# Patient Record
Sex: FEMALE | Race: WHITE | NOT HISPANIC OR LATINO | Employment: UNEMPLOYED | URBAN - NONMETROPOLITAN AREA
[De-identification: names, ages, dates, MRNs, and addresses within clinical notes are randomized per-mention and may not be internally consistent; named-entity substitution may affect disease eponyms.]

---

## 2018-02-06 ENCOUNTER — APPOINTMENT (OUTPATIENT)
Dept: GENERAL RADIOLOGY | Facility: HOSPITAL | Age: 28
End: 2018-02-06

## 2018-02-06 ENCOUNTER — HOSPITAL ENCOUNTER (INPATIENT)
Facility: HOSPITAL | Age: 28
LOS: 3 days | Discharge: HOME | End: 2018-02-09
Attending: EMERGENCY MEDICINE | Admitting: INTERNAL MEDICINE

## 2018-02-06 DIAGNOSIS — J11.1 INFLUENZA: Primary | ICD-10-CM

## 2018-02-06 DIAGNOSIS — N39.0 COMPLICATED UTI (URINARY TRACT INFECTION): ICD-10-CM

## 2018-02-06 PROBLEM — A41.9 SEPSIS (HCC): Status: ACTIVE | Noted: 2018-02-06

## 2018-02-06 LAB
A-A DO2: 35.5 MMHG (ref 0–300)
ALBUMIN SERPL-MCNC: 2.8 G/DL (ref 3.5–5)
ALBUMIN/GLOB SERPL: 0.8 G/DL (ref 1.5–2.5)
ALP SERPL-CCNC: 57 U/L (ref 35–104)
ALT SERPL W P-5'-P-CCNC: 8 U/L (ref 10–36)
ANION GAP SERPL CALCULATED.3IONS-SCNC: 6.8 MMOL/L (ref 3.6–11.2)
ARTERIAL PATENCY WRIST A: POSITIVE
AST SERPL-CCNC: 10 U/L (ref 10–30)
ATMOSPHERIC PRESS: 731 MMHG
B-HCG UR QL: NEGATIVE
BACTERIA UR QL AUTO: ABNORMAL /HPF
BASE EXCESS BLDA CALC-SCNC: -5.2 MMOL/L
BASOPHILS # BLD AUTO: 0.01 10*3/MM3 (ref 0–0.3)
BASOPHILS NFR BLD AUTO: 0.2 % (ref 0–2)
BDY SITE: ABNORMAL
BILIRUB SERPL-MCNC: 0.3 MG/DL (ref 0.2–1.8)
BILIRUB UR QL STRIP: NEGATIVE
BODY TEMPERATURE: 98.6 C
BUN BLD-MCNC: 17 MG/DL (ref 7–21)
BUN/CREAT SERPL: 11.4 (ref 7–25)
CALCIUM SPEC-SCNC: 7.3 MG/DL (ref 7.7–10)
CHLORIDE SERPL-SCNC: 115 MMOL/L (ref 99–112)
CLARITY UR: ABNORMAL
CO2 SERPL-SCNC: 18.2 MMOL/L (ref 24.3–31.9)
COHGB MFR BLD: 1.3 % (ref 0–5)
COLOR UR: YELLOW
CREAT BLD-MCNC: 1.49 MG/DL (ref 0.43–1.29)
CRP SERPL-MCNC: 9.47 MG/DL (ref 0–0.99)
D-LACTATE SERPL-SCNC: 0.8 MMOL/L (ref 0.5–2)
DEPRECATED RDW RBC AUTO: 54.2 FL (ref 37–54)
DEVELOPER EXPIRATION DATE: NORMAL
DEVELOPER LOT NUMBER: NORMAL
EOSINOPHIL # BLD AUTO: 0.02 10*3/MM3 (ref 0–0.7)
EOSINOPHIL NFR BLD AUTO: 0.4 % (ref 0–5)
ERYTHROCYTE [DISTWIDTH] IN BLOOD BY AUTOMATED COUNT: 16.1 % (ref 11.5–14.5)
EXPIRATION DATE: NORMAL
FECAL OCCULT BLOOD SCREEN, POC: NEGATIVE
FLUAV AG NPH QL: POSITIVE
FLUBV AG NPH QL IA: NEGATIVE
GFR SERPL CREATININE-BSD FRML MDRD: 42 ML/MIN/1.73
GLOBULIN UR ELPH-MCNC: 3.6 GM/DL
GLUCOSE BLD-MCNC: 109 MG/DL (ref 70–110)
GLUCOSE UR STRIP-MCNC: NEGATIVE MG/DL
HCO3 BLDA-SCNC: 19.2 MMOL/L (ref 22–26)
HCT VFR BLD AUTO: 27 % (ref 37–47)
HCT VFR BLD CALC: 25 % (ref 37–47)
HGB BLD-MCNC: 8 G/DL (ref 12–16)
HGB BLDA-MCNC: 8.4 G/DL (ref 12–16)
HGB UR QL STRIP.AUTO: ABNORMAL
HOROWITZ INDEX BLD+IHG-RTO: 21 %
HYALINE CASTS UR QL AUTO: ABNORMAL /LPF
IMM GRANULOCYTES # BLD: 0.01 10*3/MM3 (ref 0–0.03)
IMM GRANULOCYTES NFR BLD: 0.2 % (ref 0–0.5)
KETONES UR QL STRIP: NEGATIVE
LEUKOCYTE ESTERASE UR QL STRIP.AUTO: ABNORMAL
LIPASE SERPL-CCNC: 24 U/L (ref 13–60)
LYMPHOCYTES # BLD AUTO: 0.93 10*3/MM3 (ref 1–3)
LYMPHOCYTES NFR BLD AUTO: 19.5 % (ref 21–51)
Lab: NORMAL
MAGNESIUM SERPL-MCNC: 1.5 MG/DL (ref 1.7–2.6)
MCH RBC QN AUTO: 29 PG (ref 27–33)
MCHC RBC AUTO-ENTMCNC: 29.6 G/DL (ref 33–37)
MCV RBC AUTO: 97.8 FL (ref 80–94)
METHGB BLD QL: 0 % (ref 0–3)
MODALITY: ABNORMAL
MONOCYTES # BLD AUTO: 0.57 10*3/MM3 (ref 0.1–0.9)
MONOCYTES NFR BLD AUTO: 11.9 % (ref 0–10)
NEGATIVE CONTROL: NEGATIVE
NEUTROPHILS # BLD AUTO: 3.23 10*3/MM3 (ref 1.4–6.5)
NEUTROPHILS NFR BLD AUTO: 67.8 % (ref 30–70)
NITRITE UR QL STRIP: NEGATIVE
OSMOLALITY SERPL CALC.SUM OF ELEC: 281.5 MOSM/KG (ref 273–305)
OXYHGB MFR BLDV: 88.4 % (ref 85–100)
PCO2 BLDA: 32.7 MM HG (ref 35–45)
PH BLDA: 7.39 PH UNITS (ref 7.35–7.45)
PH UR STRIP.AUTO: 7.5 [PH] (ref 5–8)
PLATELET # BLD AUTO: 250 10*3/MM3 (ref 130–400)
PMV BLD AUTO: 8.7 FL (ref 6–10)
PO2 BLDA: 69 MM HG (ref 80–100)
POSITIVE CONTROL: POSITIVE
POTASSIUM BLD-SCNC: 3.3 MMOL/L (ref 3.5–5.3)
PROT SERPL-MCNC: 6.4 G/DL (ref 6–8)
PROT UR QL STRIP: ABNORMAL
RBC # BLD AUTO: 2.76 10*6/MM3 (ref 4.2–5.4)
RBC # UR: ABNORMAL /HPF
REF LAB TEST METHOD: ABNORMAL
SAO2 % BLDCOA: 89.6 % (ref 90–100)
SODIUM BLD-SCNC: 140 MMOL/L (ref 135–153)
SP GR UR STRIP: 1.01 (ref 1–1.03)
SQUAMOUS #/AREA URNS HPF: ABNORMAL /HPF
UROBILINOGEN UR QL STRIP: ABNORMAL
WBC NRBC COR # BLD: 4.77 10*3/MM3 (ref 4.5–12.5)
WBC UR QL AUTO: ABNORMAL /HPF

## 2018-02-06 PROCEDURE — 82805 BLOOD GASES W/O2 SATURATION: CPT | Performed by: EMERGENCY MEDICINE

## 2018-02-06 PROCEDURE — 94799 UNLISTED PULMONARY SVC/PX: CPT

## 2018-02-06 PROCEDURE — 83690 ASSAY OF LIPASE: CPT | Performed by: EMERGENCY MEDICINE

## 2018-02-06 PROCEDURE — 86140 C-REACTIVE PROTEIN: CPT | Performed by: PHYSICIAN ASSISTANT

## 2018-02-06 PROCEDURE — 25010000002 PIPERACILLIN-TAZOBACTAM: Performed by: EMERGENCY MEDICINE

## 2018-02-06 PROCEDURE — 94640 AIRWAY INHALATION TREATMENT: CPT

## 2018-02-06 PROCEDURE — 87086 URINE CULTURE/COLONY COUNT: CPT | Performed by: EMERGENCY MEDICINE

## 2018-02-06 PROCEDURE — 36600 WITHDRAWAL OF ARTERIAL BLOOD: CPT | Performed by: EMERGENCY MEDICINE

## 2018-02-06 PROCEDURE — 87077 CULTURE AEROBIC IDENTIFY: CPT | Performed by: EMERGENCY MEDICINE

## 2018-02-06 PROCEDURE — 81025 URINE PREGNANCY TEST: CPT | Performed by: EMERGENCY MEDICINE

## 2018-02-06 PROCEDURE — 25010000002 HEPARIN (PORCINE) PER 1000 UNITS: Performed by: PHYSICIAN ASSISTANT

## 2018-02-06 PROCEDURE — 82375 ASSAY CARBOXYHB QUANT: CPT | Performed by: EMERGENCY MEDICINE

## 2018-02-06 PROCEDURE — 83050 HGB METHEMOGLOBIN QUAN: CPT | Performed by: EMERGENCY MEDICINE

## 2018-02-06 PROCEDURE — 71045 X-RAY EXAM CHEST 1 VIEW: CPT | Performed by: RADIOLOGY

## 2018-02-06 PROCEDURE — 87040 BLOOD CULTURE FOR BACTERIA: CPT | Performed by: PHYSICIAN ASSISTANT

## 2018-02-06 PROCEDURE — 71045 X-RAY EXAM CHEST 1 VIEW: CPT

## 2018-02-06 PROCEDURE — 83735 ASSAY OF MAGNESIUM: CPT | Performed by: HOSPITALIST

## 2018-02-06 PROCEDURE — 81001 URINALYSIS AUTO W/SCOPE: CPT | Performed by: EMERGENCY MEDICINE

## 2018-02-06 PROCEDURE — 87804 INFLUENZA ASSAY W/OPTIC: CPT | Performed by: EMERGENCY MEDICINE

## 2018-02-06 PROCEDURE — 83605 ASSAY OF LACTIC ACID: CPT | Performed by: EMERGENCY MEDICINE

## 2018-02-06 PROCEDURE — 99223 1ST HOSP IP/OBS HIGH 75: CPT | Performed by: HOSPITALIST

## 2018-02-06 PROCEDURE — 93010 ELECTROCARDIOGRAM REPORT: CPT | Performed by: INTERNAL MEDICINE

## 2018-02-06 PROCEDURE — 93005 ELECTROCARDIOGRAM TRACING: CPT | Performed by: EMERGENCY MEDICINE

## 2018-02-06 PROCEDURE — 87186 SC STD MICRODIL/AGAR DIL: CPT | Performed by: EMERGENCY MEDICINE

## 2018-02-06 PROCEDURE — 85025 COMPLETE CBC W/AUTO DIFF WBC: CPT | Performed by: EMERGENCY MEDICINE

## 2018-02-06 PROCEDURE — 25010000002 PROMETHAZINE PER 50 MG: Performed by: HOSPITALIST

## 2018-02-06 PROCEDURE — 82270 OCCULT BLOOD FECES: CPT | Performed by: EMERGENCY MEDICINE

## 2018-02-06 PROCEDURE — 80053 COMPREHEN METABOLIC PANEL: CPT | Performed by: EMERGENCY MEDICINE

## 2018-02-06 PROCEDURE — 87040 BLOOD CULTURE FOR BACTERIA: CPT | Performed by: EMERGENCY MEDICINE

## 2018-02-06 PROCEDURE — 99285 EMERGENCY DEPT VISIT HI MDM: CPT

## 2018-02-06 RX ORDER — OSELTAMIVIR PHOSPHATE 6 MG/ML
30 FOR SUSPENSION ORAL EVERY 12 HOURS SCHEDULED
Status: DISCONTINUED | OUTPATIENT
Start: 2018-02-07 | End: 2018-02-09 | Stop reason: HOSPADM

## 2018-02-06 RX ORDER — POTASSIUM CHLORIDE 20 MEQ/1
40 TABLET, EXTENDED RELEASE ORAL AS NEEDED
Status: DISCONTINUED | OUTPATIENT
Start: 2018-02-06 | End: 2018-02-09 | Stop reason: HOSPADM

## 2018-02-06 RX ORDER — SODIUM CHLORIDE 9 MG/ML
100 INJECTION, SOLUTION INTRAVENOUS CONTINUOUS
Status: DISCONTINUED | OUTPATIENT
Start: 2018-02-06 | End: 2018-02-06

## 2018-02-06 RX ORDER — NITROGLYCERIN 0.4 MG/1
0.4 TABLET SUBLINGUAL
Status: DISCONTINUED | OUTPATIENT
Start: 2018-02-06 | End: 2018-02-09 | Stop reason: HOSPADM

## 2018-02-06 RX ORDER — IPRATROPIUM BROMIDE AND ALBUTEROL SULFATE 2.5; .5 MG/3ML; MG/3ML
3 SOLUTION RESPIRATORY (INHALATION)
Status: COMPLETED | OUTPATIENT
Start: 2018-02-06 | End: 2018-02-06

## 2018-02-06 RX ORDER — SODIUM CHLORIDE 9 MG/ML
INJECTION, SOLUTION INTRAVENOUS
Status: COMPLETED
Start: 2018-02-06 | End: 2018-02-06

## 2018-02-06 RX ORDER — SODIUM CHLORIDE 9 MG/ML
100 INJECTION, SOLUTION INTRAVENOUS CONTINUOUS
Status: DISCONTINUED | OUTPATIENT
Start: 2018-02-06 | End: 2018-02-08

## 2018-02-06 RX ORDER — SODIUM CHLORIDE 0.9 % (FLUSH) 0.9 %
1-10 SYRINGE (ML) INJECTION AS NEEDED
Status: DISCONTINUED | OUTPATIENT
Start: 2018-02-06 | End: 2018-02-09 | Stop reason: HOSPADM

## 2018-02-06 RX ORDER — POTASSIUM CHLORIDE 1.5 G/1.77G
40 POWDER, FOR SOLUTION ORAL AS NEEDED
Status: DISCONTINUED | OUTPATIENT
Start: 2018-02-06 | End: 2018-02-09 | Stop reason: HOSPADM

## 2018-02-06 RX ORDER — MAGNESIUM SULFATE HEPTAHYDRATE 40 MG/ML
2 INJECTION, SOLUTION INTRAVENOUS AS NEEDED
Status: DISCONTINUED | OUTPATIENT
Start: 2018-02-06 | End: 2018-02-09 | Stop reason: HOSPADM

## 2018-02-06 RX ORDER — MAGNESIUM SULFATE HEPTAHYDRATE 40 MG/ML
2 INJECTION, SOLUTION INTRAVENOUS AS NEEDED
Status: DISCONTINUED | OUTPATIENT
Start: 2018-02-06 | End: 2018-02-06

## 2018-02-06 RX ORDER — POTASSIUM CHLORIDE 20 MEQ/1
40 TABLET, EXTENDED RELEASE ORAL AS NEEDED
Status: DISCONTINUED | OUTPATIENT
Start: 2018-02-06 | End: 2018-02-06

## 2018-02-06 RX ORDER — POTASSIUM CHLORIDE 7.45 MG/ML
10 INJECTION INTRAVENOUS
Status: DISCONTINUED | OUTPATIENT
Start: 2018-02-06 | End: 2018-02-09 | Stop reason: HOSPADM

## 2018-02-06 RX ORDER — POTASSIUM CHLORIDE 7.45 MG/ML
10 INJECTION INTRAVENOUS
Status: DISCONTINUED | OUTPATIENT
Start: 2018-02-06 | End: 2018-02-06

## 2018-02-06 RX ORDER — MAGNESIUM SULFATE HEPTAHYDRATE 40 MG/ML
4 INJECTION, SOLUTION INTRAVENOUS AS NEEDED
Status: DISCONTINUED | OUTPATIENT
Start: 2018-02-06 | End: 2018-02-06

## 2018-02-06 RX ORDER — MAGNESIUM SULFATE 1 G/100ML
1 INJECTION INTRAVENOUS AS NEEDED
Status: DISCONTINUED | OUTPATIENT
Start: 2018-02-06 | End: 2018-02-09 | Stop reason: HOSPADM

## 2018-02-06 RX ORDER — HEPARIN SODIUM 5000 [USP'U]/ML
5000 INJECTION, SOLUTION INTRAVENOUS; SUBCUTANEOUS EVERY 12 HOURS SCHEDULED
Status: DISCONTINUED | OUTPATIENT
Start: 2018-02-06 | End: 2018-02-09 | Stop reason: HOSPADM

## 2018-02-06 RX ORDER — OSELTAMIVIR PHOSPHATE 75 MG/1
75 CAPSULE ORAL ONCE
Status: COMPLETED | OUTPATIENT
Start: 2018-02-06 | End: 2018-02-06

## 2018-02-06 RX ORDER — MAGNESIUM SULFATE HEPTAHYDRATE 40 MG/ML
4 INJECTION, SOLUTION INTRAVENOUS AS NEEDED
Status: DISCONTINUED | OUTPATIENT
Start: 2018-02-06 | End: 2018-02-09 | Stop reason: HOSPADM

## 2018-02-06 RX ORDER — POTASSIUM CHLORIDE 1.5 G/1.77G
40 POWDER, FOR SOLUTION ORAL AS NEEDED
Status: DISCONTINUED | OUTPATIENT
Start: 2018-02-06 | End: 2018-02-06

## 2018-02-06 RX ADMIN — IPRATROPIUM BROMIDE AND ALBUTEROL SULFATE 3 ML: .5; 3 SOLUTION RESPIRATORY (INHALATION) at 13:54

## 2018-02-06 RX ADMIN — HEPARIN SODIUM 5000 UNITS: 5000 INJECTION, SOLUTION INTRAVENOUS; SUBCUTANEOUS at 19:53

## 2018-02-06 RX ADMIN — PROMETHAZINE HYDROCHLORIDE 6.25 MG: 25 INJECTION INTRAMUSCULAR; INTRAVENOUS at 22:09

## 2018-02-06 RX ADMIN — PIPERACILLIN SODIUM,TAZOBACTAM SODIUM 3.38 G: 3; .375 INJECTION, POWDER, FOR SOLUTION INTRAVENOUS at 16:22

## 2018-02-06 RX ADMIN — IPRATROPIUM BROMIDE AND ALBUTEROL SULFATE 3 ML: .5; 3 SOLUTION RESPIRATORY (INHALATION) at 12:54

## 2018-02-06 RX ADMIN — SODIUM CHLORIDE 150 ML/HR: 9 INJECTION, SOLUTION INTRAVENOUS at 14:20

## 2018-02-06 RX ADMIN — PIPERACILLIN SODIUM,TAZOBACTAM SODIUM 3.38 G: 3; .375 INJECTION, POWDER, FOR SOLUTION INTRAVENOUS at 23:59

## 2018-02-06 RX ADMIN — OSELTAMIVIR PHOSPHATE 75 MG: 75 CAPSULE ORAL at 14:24

## 2018-02-06 RX ADMIN — SODIUM CHLORIDE 1000 ML: 9 INJECTION, SOLUTION INTRAVENOUS at 14:47

## 2018-02-06 RX ADMIN — SODIUM CHLORIDE 1443 ML: 9 INJECTION, SOLUTION INTRAVENOUS at 12:52

## 2018-02-06 RX ADMIN — SODIUM CHLORIDE 100 ML/HR: 9 INJECTION, SOLUTION INTRAVENOUS at 18:46

## 2018-02-06 RX ADMIN — SODIUM CHLORIDE 125 ML/HR: 9 INJECTION, SOLUTION INTRAVENOUS at 23:14

## 2018-02-06 RX ADMIN — IPRATROPIUM BROMIDE AND ALBUTEROL SULFATE 3 ML: .5; 3 SOLUTION RESPIRATORY (INHALATION) at 13:28

## 2018-02-06 RX ADMIN — SODIUM CHLORIDE 150 ML/HR: 9 INJECTION, SOLUTION INTRAVENOUS at 12:48

## 2018-02-06 NOTE — ED NOTES
Attempted to straight cath patient, no urine obtainable at this time. Patient did have wet brief. Changed patient's brief, informed her I would return to attempt to obtain urine again later.     Brittani Holliday, RN  02/06/18 [id number]
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
## 2018-02-06 NOTE — PLAN OF CARE
Problem: Sepsis (Adult)  Goal: Signs and Symptoms of Listed Potential Problems Will be Absent or Manageable (Sepsis)  Outcome: Ongoing (interventions implemented as appropriate)
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
## 2018-02-07 LAB
ANION GAP SERPL CALCULATED.3IONS-SCNC: 7.6 MMOL/L (ref 3.6–11.2)
BASOPHILS # BLD AUTO: 0.01 10*3/MM3 (ref 0–0.3)
BASOPHILS NFR BLD AUTO: 0.3 % (ref 0–2)
BUN BLD-MCNC: 15 MG/DL (ref 7–21)
BUN/CREAT SERPL: 10.6 (ref 7–25)
CALCIUM SPEC-SCNC: 7.1 MG/DL (ref 7.7–10)
CHLORIDE SERPL-SCNC: 116 MMOL/L (ref 99–112)
CO2 SERPL-SCNC: 16.4 MMOL/L (ref 24.3–31.9)
CREAT BLD-MCNC: 1.41 MG/DL (ref 0.43–1.29)
CREAT UR-MCNC: 17.9 MG/DL
CRP SERPL-MCNC: 10.77 MG/DL (ref 0–0.99)
DEPRECATED RDW RBC AUTO: 55.7 FL (ref 37–54)
EOSINOPHIL # BLD AUTO: 0.01 10*3/MM3 (ref 0–0.7)
EOSINOPHIL NFR BLD AUTO: 0.3 % (ref 0–5)
ERYTHROCYTE [DISTWIDTH] IN BLOOD BY AUTOMATED COUNT: 16.1 % (ref 11.5–14.5)
FOLATE SERPL-MCNC: 4.37 NG/ML (ref 5.4–20)
GFR SERPL CREATININE-BSD FRML MDRD: 45 ML/MIN/1.73
GLUCOSE BLD-MCNC: 87 MG/DL (ref 70–110)
HCT VFR BLD AUTO: 26.5 % (ref 37–47)
HGB BLD-MCNC: 7.8 G/DL (ref 12–16)
IMM GRANULOCYTES # BLD: 0.01 10*3/MM3 (ref 0–0.03)
IMM GRANULOCYTES NFR BLD: 0.3 % (ref 0–0.5)
IRON 24H UR-MRATE: 5 MCG/DL (ref 49–151)
IRON SATN MFR SERPL: 2 % (ref 15–50)
LYMPHOCYTES # BLD AUTO: 0.71 10*3/MM3 (ref 1–3)
LYMPHOCYTES NFR BLD AUTO: 18.4 % (ref 21–51)
MAGNESIUM SERPL-MCNC: 1.6 MG/DL (ref 1.7–2.6)
MCH RBC QN AUTO: 29.1 PG (ref 27–33)
MCHC RBC AUTO-ENTMCNC: 29.4 G/DL (ref 33–37)
MCV RBC AUTO: 98.9 FL (ref 80–94)
MONOCYTES # BLD AUTO: 0.3 10*3/MM3 (ref 0.1–0.9)
MONOCYTES NFR BLD AUTO: 7.8 % (ref 0–10)
NEUTROPHILS # BLD AUTO: 2.81 10*3/MM3 (ref 1.4–6.5)
NEUTROPHILS NFR BLD AUTO: 72.9 % (ref 30–70)
OSMOLALITY SERPL CALC.SUM OF ELEC: 279.6 MOSM/KG (ref 273–305)
PLATELET # BLD AUTO: 266 10*3/MM3 (ref 130–400)
PMV BLD AUTO: 9.2 FL (ref 6–10)
POTASSIUM BLD-SCNC: 4 MMOL/L (ref 3.5–5.3)
PROT UR-MCNC: 88.1 MG/DL
PROT/CREAT UR: 4921.8 MG/G CREA (ref 0–200)
RBC # BLD AUTO: 2.68 10*6/MM3 (ref 4.2–5.4)
SODIUM BLD-SCNC: 140 MMOL/L (ref 135–153)
TIBC SERPL-MCNC: 206 MCG/DL (ref 241–421)
VIT B12 BLD-MCNC: 265 PG/ML (ref 211–911)
WBC NRBC COR # BLD: 3.85 10*3/MM3 (ref 4.5–12.5)

## 2018-02-07 PROCEDURE — 85025 COMPLETE CBC W/AUTO DIFF WBC: CPT | Performed by: PHYSICIAN ASSISTANT

## 2018-02-07 PROCEDURE — 94799 UNLISTED PULMONARY SVC/PX: CPT

## 2018-02-07 PROCEDURE — 25010000002 HEPARIN (PORCINE) PER 1000 UNITS: Performed by: PHYSICIAN ASSISTANT

## 2018-02-07 PROCEDURE — 25010000002 CEFEPIME PER 500 MG: Performed by: INTERNAL MEDICINE

## 2018-02-07 PROCEDURE — 25010000002 MAGNESIUM SULFATE 2 GM/50ML SOLUTION: Performed by: HOSPITALIST

## 2018-02-07 PROCEDURE — 25010000002 PIPERACILLIN-TAZOBACTAM: Performed by: PHYSICIAN ASSISTANT

## 2018-02-07 PROCEDURE — 82607 VITAMIN B-12: CPT | Performed by: PHYSICIAN ASSISTANT

## 2018-02-07 PROCEDURE — 83540 ASSAY OF IRON: CPT | Performed by: PHYSICIAN ASSISTANT

## 2018-02-07 PROCEDURE — 99233 SBSQ HOSP IP/OBS HIGH 50: CPT | Performed by: INTERNAL MEDICINE

## 2018-02-07 PROCEDURE — 83550 IRON BINDING TEST: CPT | Performed by: PHYSICIAN ASSISTANT

## 2018-02-07 PROCEDURE — 86140 C-REACTIVE PROTEIN: CPT | Performed by: PHYSICIAN ASSISTANT

## 2018-02-07 PROCEDURE — 82570 ASSAY OF URINE CREATININE: CPT | Performed by: INTERNAL MEDICINE

## 2018-02-07 PROCEDURE — 84156 ASSAY OF PROTEIN URINE: CPT | Performed by: INTERNAL MEDICINE

## 2018-02-07 PROCEDURE — 80048 BASIC METABOLIC PNL TOTAL CA: CPT | Performed by: PHYSICIAN ASSISTANT

## 2018-02-07 PROCEDURE — 83735 ASSAY OF MAGNESIUM: CPT | Performed by: HOSPITALIST

## 2018-02-07 PROCEDURE — 82746 ASSAY OF FOLIC ACID SERUM: CPT | Performed by: PHYSICIAN ASSISTANT

## 2018-02-07 RX ORDER — ACETAMINOPHEN 325 MG/1
650 TABLET ORAL EVERY 6 HOURS PRN
Status: DISCONTINUED | OUTPATIENT
Start: 2018-02-07 | End: 2018-02-09 | Stop reason: HOSPADM

## 2018-02-07 RX ORDER — SODIUM HYPOCHLORITE 1.25 MG/ML
SOLUTION TOPICAL 2 TIMES DAILY
Status: DISCONTINUED | OUTPATIENT
Start: 2018-02-07 | End: 2018-02-09 | Stop reason: HOSPADM

## 2018-02-07 RX ORDER — FOLIC ACID 1 MG/1
1 TABLET ORAL DAILY
Status: DISCONTINUED | OUTPATIENT
Start: 2018-02-07 | End: 2018-02-09 | Stop reason: HOSPADM

## 2018-02-07 RX ORDER — MAGNESIUM SULFATE HEPTAHYDRATE 40 MG/ML
2 INJECTION, SOLUTION INTRAVENOUS ONCE
Status: COMPLETED | OUTPATIENT
Start: 2018-02-07 | End: 2018-02-07

## 2018-02-07 RX ORDER — LANOLIN ALCOHOL/MO/W.PET/CERES
1000 CREAM (GRAM) TOPICAL DAILY
Status: DISCONTINUED | OUTPATIENT
Start: 2018-02-07 | End: 2018-02-09 | Stop reason: HOSPADM

## 2018-02-07 RX ORDER — FERROUS SULFATE 325(65) MG
325 TABLET ORAL 2 TIMES DAILY WITH MEALS
Status: DISCONTINUED | OUTPATIENT
Start: 2018-02-07 | End: 2018-02-09 | Stop reason: HOSPADM

## 2018-02-07 RX ADMIN — HEPARIN SODIUM 5000 UNITS: 5000 INJECTION, SOLUTION INTRAVENOUS; SUBCUTANEOUS at 20:55

## 2018-02-07 RX ADMIN — ACETAMINOPHEN 650 MG: 325 TABLET, FILM COATED ORAL at 23:31

## 2018-02-07 RX ADMIN — ACETAMINOPHEN 650 MG: 325 TABLET, FILM COATED ORAL at 08:25

## 2018-02-07 RX ADMIN — CEFEPIME: 1 INJECTION, POWDER, FOR SOLUTION INTRAVENOUS at 16:47

## 2018-02-07 RX ADMIN — SODIUM CHLORIDE 125 ML/HR: 9 INJECTION, SOLUTION INTRAVENOUS at 12:21

## 2018-02-07 RX ADMIN — SODIUM HYPOCHLORITE: 1.25 SOLUTION TOPICAL at 11:41

## 2018-02-07 RX ADMIN — CEFEPIME: 1 INJECTION, POWDER, FOR SOLUTION INTRAVENOUS at 18:00

## 2018-02-07 RX ADMIN — FERROUS SULFATE TAB 325 MG (65 MG ELEMENTAL FE) 325 MG: 325 (65 FE) TAB at 17:25

## 2018-02-07 RX ADMIN — OSELTAMIVIR PHOSPHATE 30 MG: 6 POWDER, FOR SUSPENSION ORAL at 08:25

## 2018-02-07 RX ADMIN — PIPERACILLIN SODIUM,TAZOBACTAM SODIUM 3.38 G: 3; .375 INJECTION, POWDER, FOR SOLUTION INTRAVENOUS at 08:25

## 2018-02-07 RX ADMIN — SODIUM HYPOCHLORITE: 1.25 SOLUTION TOPICAL at 20:54

## 2018-02-07 RX ADMIN — CEFEPIME: 1 INJECTION, POWDER, FOR SOLUTION INTRAVENOUS at 20:58

## 2018-02-07 RX ADMIN — CEFEPIME: 1 INJECTION, POWDER, FOR SOLUTION INTRAVENOUS at 17:50

## 2018-02-07 RX ADMIN — Medication 1000 MCG: at 17:25

## 2018-02-07 RX ADMIN — MAGNESIUM SULFATE HEPTAHYDRATE 2 G: 40 INJECTION, SOLUTION INTRAVENOUS at 17:28

## 2018-02-07 RX ADMIN — FOLIC ACID 1 MG: 1 TABLET ORAL at 17:25

## 2018-02-07 RX ADMIN — HEPARIN SODIUM 5000 UNITS: 5000 INJECTION, SOLUTION INTRAVENOUS; SUBCUTANEOUS at 08:25

## 2018-02-07 RX ADMIN — OSELTAMIVIR PHOSPHATE 30 MG: 6 POWDER, FOR SUSPENSION ORAL at 20:55

## 2018-02-07 RX ADMIN — SODIUM CHLORIDE 100 ML/HR: 9 INJECTION, SOLUTION INTRAVENOUS at 23:31
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
## 2018-02-07 NOTE — PLAN OF CARE
Problem: Sepsis (Adult)  Goal: Signs and Symptoms of Listed Potential Problems Will be Absent or Manageable (Sepsis)  Outcome: Ongoing (interventions implemented as appropriate)      Problem: Pressure Ulcer (Adult)  Goal: Signs and Symptoms of Listed Potential Problems Will be Absent or Manageable (Pressure Ulcer)  Outcome: Ongoing (interventions implemented as appropriate)

## 2018-02-07 NOTE — NURSING NOTE
Patient presents with Stage 4 PI's to coccyx and left ischial tuberosity and a Stage 3 to right ischial tuberosity.  See flowsheet documentation.  Foul odor present.  Treatment ordered.

## 2018-02-07 NOTE — PROGRESS NOTES
Malnutrition Severity Assessment    Patient Name:  Awais Dorman  YOB: 1990  MRN: [id number]  Admit Date:  2/6/2018    Patient meets criteria for : Moderate malnutrition    Comments:  MSA initiated due to BMI 16.6.  MD please review, co-sign and document severity of moderate nutrition in the problem list.  Will start supplement drink bid.  Recommend add MVI daily to promote wound healing.     Malnutrition Type: Chronic Illness Malnutrition     Malnutrition Type (last 8 hours)      Malnutrition Severity Assessment       02/07/18 1747    Malnutrition Severity Assessment    Malnutrition Type Chronic Illness Malnutrition      02/07/18 1747    Physical Signs of Malnutrition (Chronic)    Muscle Wasting Mild    Fat Loss Mild    Fluid Accumulation None    Secondary Physical Signs Present (comment)   pressure ulcers      02/07/18 1747    Weight Status (Chronic)    BMI Mod (<17)    %IBW Mod <80%      02/07/18 1747    Criteria Met (Must meet criteria for severity in at least 2 of these categories: M Wasting, Fat Loss, Fluid, Secondary Signs, Wt. Status, Intake)    Patient meets criteria for  Moderate malnutrition          Electronically signed by:  Brittani Ramsay RD  02/07/18 5:50 PM

## 2018-02-07 NOTE — PROGRESS NOTES
Discharge Planning Assessment   Yovany     Patient Name: Awais Dorman  MRN: [id number]  Today's Date: 2/7/2018    Admit Date: 2/6/2018          Discharge Needs Assessment       02/07/18 1345    Living Environment    Lives With spouse    Living Arrangements house    Type of Financial/Environmental Concern none    Transportation Available car    Living Environment    Provides Primary Care For spouse    Primary Care Provided By spouse/significant other    Quality Of Family Relationships supportive    Able to Return to Prior Living Arrangements yes            Discharge Plan       02/07/18 1345    Case Management/Social Work Plan    Plan Pt admitted on 2/6/18.  SS received consult per Case Management for discharge planning.  SS spoke with pt on this date.  Pt lives at home with spouse and plans to return home at discharge.  Pt currently does not utilize home health services.  Pt currently utilizes wheelchair, hospital bed and ebonie lift via unknown provider.  Pt's PCP is Dr. Mcgregor.  Pt currently utilizes [facility].  Pt states no living will or POA.  Pt states no additional education needed. SS will follow and assist with discharge needs.     Patient/Family In Agreement With Plan yes        Discharge Placement     No information found                Demographic Summary       02/07/18 1344    Referral Information    Admission Type inpatient    Arrived From admitted as an inpatient    Referral Source nursing    Reason For Consult discharge planning            Functional Status     None            Psychosocial     None            Abuse/Neglect     None            Legal     None            Substance Abuse     None            Patient Forms     None          Shiela Garcia

## 2018-02-08 LAB
ANION GAP SERPL CALCULATED.3IONS-SCNC: 11.7 MMOL/L (ref 3.6–11.2)
BASOPHILS # BLD AUTO: 0.01 10*3/MM3 (ref 0–0.3)
BASOPHILS NFR BLD AUTO: 0.3 % (ref 0–2)
BUN BLD-MCNC: 11 MG/DL (ref 7–21)
BUN/CREAT SERPL: 7.1 (ref 7–25)
CALCIUM SPEC-SCNC: 7.1 MG/DL (ref 7.7–10)
CHLORIDE SERPL-SCNC: 112 MMOL/L (ref 99–112)
CO2 SERPL-SCNC: 15.3 MMOL/L (ref 24.3–31.9)
CREAT BLD-MCNC: 1.55 MG/DL (ref 0.43–1.29)
CRP SERPL-MCNC: 8.16 MG/DL (ref 0–0.99)
DEPRECATED RDW RBC AUTO: 55.5 FL (ref 37–54)
EOSINOPHIL # BLD AUTO: 0.06 10*3/MM3 (ref 0–0.7)
EOSINOPHIL NFR BLD AUTO: 1.7 % (ref 0–5)
ERYTHROCYTE [DISTWIDTH] IN BLOOD BY AUTOMATED COUNT: 16.2 % (ref 11.5–14.5)
GFR SERPL CREATININE-BSD FRML MDRD: 40 ML/MIN/1.73
GLUCOSE BLD-MCNC: 70 MG/DL (ref 70–110)
GLUCOSE BLDC GLUCOMTR-MCNC: 75 MG/DL (ref 70–130)
HCT VFR BLD AUTO: 26.3 % (ref 37–47)
HGB BLD-MCNC: 7.8 G/DL (ref 12–16)
IMM GRANULOCYTES # BLD: 0.01 10*3/MM3 (ref 0–0.03)
IMM GRANULOCYTES NFR BLD: 0.3 % (ref 0–0.5)
LYMPHOCYTES # BLD AUTO: 1.49 10*3/MM3 (ref 1–3)
LYMPHOCYTES NFR BLD AUTO: 42.8 % (ref 21–51)
MAGNESIUM SERPL-MCNC: 2.5 MG/DL (ref 1.7–2.6)
MCH RBC QN AUTO: 29.1 PG (ref 27–33)
MCHC RBC AUTO-ENTMCNC: 29.7 G/DL (ref 33–37)
MCV RBC AUTO: 98.1 FL (ref 80–94)
MONOCYTES # BLD AUTO: 0.44 10*3/MM3 (ref 0.1–0.9)
MONOCYTES NFR BLD AUTO: 12.6 % (ref 0–10)
NEUTROPHILS # BLD AUTO: 1.47 10*3/MM3 (ref 1.4–6.5)
NEUTROPHILS NFR BLD AUTO: 42.3 % (ref 30–70)
OSMOLALITY SERPL CALC.SUM OF ELEC: 275.4 MOSM/KG (ref 273–305)
PLATELET # BLD AUTO: 257 10*3/MM3 (ref 130–400)
PMV BLD AUTO: 9.4 FL (ref 6–10)
POTASSIUM BLD-SCNC: 4.1 MMOL/L (ref 3.5–5.3)
RBC # BLD AUTO: 2.68 10*6/MM3 (ref 4.2–5.4)
SODIUM BLD-SCNC: 139 MMOL/L (ref 135–153)
WBC NRBC COR # BLD: 3.48 10*3/MM3 (ref 4.5–12.5)

## 2018-02-08 PROCEDURE — 25010000002 CEFEPIME PER 500 MG: Performed by: INTERNAL MEDICINE

## 2018-02-08 PROCEDURE — 82962 GLUCOSE BLOOD TEST: CPT

## 2018-02-08 PROCEDURE — 94799 UNLISTED PULMONARY SVC/PX: CPT

## 2018-02-08 PROCEDURE — 85025 COMPLETE CBC W/AUTO DIFF WBC: CPT | Performed by: INTERNAL MEDICINE

## 2018-02-08 PROCEDURE — 83735 ASSAY OF MAGNESIUM: CPT | Performed by: INTERNAL MEDICINE

## 2018-02-08 PROCEDURE — 80048 BASIC METABOLIC PNL TOTAL CA: CPT | Performed by: INTERNAL MEDICINE

## 2018-02-08 PROCEDURE — 99232 SBSQ HOSP IP/OBS MODERATE 35: CPT | Performed by: INTERNAL MEDICINE

## 2018-02-08 PROCEDURE — 86140 C-REACTIVE PROTEIN: CPT | Performed by: INTERNAL MEDICINE

## 2018-02-08 PROCEDURE — 25010000002 HEPARIN (PORCINE) PER 1000 UNITS: Performed by: PHYSICIAN ASSISTANT

## 2018-02-08 RX ORDER — MULTIVITAMIN
1 TABLET ORAL DAILY
Status: DISCONTINUED | OUTPATIENT
Start: 2018-02-08 | End: 2018-02-09 | Stop reason: HOSPADM

## 2018-02-08 RX ADMIN — HEPARIN SODIUM 5000 UNITS: 5000 INJECTION, SOLUTION INTRAVENOUS; SUBCUTANEOUS at 21:25

## 2018-02-08 RX ADMIN — Medication 1000 MCG: at 08:21

## 2018-02-08 RX ADMIN — HEPARIN SODIUM 5000 UNITS: 5000 INJECTION, SOLUTION INTRAVENOUS; SUBCUTANEOUS at 08:21

## 2018-02-08 RX ADMIN — OSELTAMIVIR PHOSPHATE 30 MG: 6 POWDER, FOR SUSPENSION ORAL at 08:21

## 2018-02-08 RX ADMIN — FERROUS SULFATE TAB 325 MG (65 MG ELEMENTAL FE) 325 MG: 325 (65 FE) TAB at 17:32

## 2018-02-08 RX ADMIN — SODIUM CHLORIDE 100 ML/HR: 9 INJECTION, SOLUTION INTRAVENOUS at 11:57

## 2018-02-08 RX ADMIN — CEFEPIME HYDROCHLORIDE 1 G: 1 INJECTION, SOLUTION INTRAVENOUS at 08:45

## 2018-02-08 RX ADMIN — OSELTAMIVIR PHOSPHATE 30 MG: 6 POWDER, FOR SUSPENSION ORAL at 21:25

## 2018-02-08 RX ADMIN — FOLIC ACID 1 MG: 1 TABLET ORAL at 08:21

## 2018-02-08 RX ADMIN — FERROUS SULFATE TAB 325 MG (65 MG ELEMENTAL FE) 325 MG: 325 (65 FE) TAB at 08:21

## 2018-02-08 RX ADMIN — CEFEPIME HYDROCHLORIDE 1 G: 1 INJECTION, SOLUTION INTRAVENOUS at 21:25

## 2018-02-08 RX ADMIN — SODIUM HYPOCHLORITE: 1.25 SOLUTION TOPICAL at 09:00

## 2018-02-08 RX ADMIN — Medication 1 TABLET: at 17:24

## 2018-02-08 NOTE — PROGRESS NOTES
"  I have personally seen and examined the patient today and discussed overnight interval progress and pertinent issues with nursing staff.    Subjective:    Patient seems to be more comfortable today and states she is feeling better.  CRP showing improvement from 10.77 down to 8.16 with persistent leukopenia.  No fever reported overnight.  Culture still in progress.    History taken from: patient chart RN      Vital Signs    BP 98/66  Pulse 88  Temp 97.4 °F (36.3 °C) (Axillary)   Resp 18  Ht 167.6 cm (66\")  Wt 45.9 kg (101 lb 4.8 oz)  SpO2 97%  BMI 16.35 kg/m2    Temp:  [97.4 °F (36.3 °C)-100.5 °F (38.1 °C)] 97.4 °F (36.3 °C)      Intake/Output Summary (Last 24 hours) at 02/08/18 1115  Last data filed at 02/08/18 0300   Gross per 24 hour   Intake             1680 ml   Output                0 ml   Net             1680 ml     Intake & Output (last 3 days)       02/05 0701 - 02/06 0700 02/06 0701 - 02/07 0700 02/07 0701 - 02/08 0700 02/08 0701 - 02/09 0700    P.O.  120 780     I.V. (mL/kg)  875 (18.7) 900 (19.6)     IV Piggyback  1543      Total Intake(mL/kg)  2538 (54.4) 1680 (36.6)     Net   +2538 +1680              Unmeasured Urine Occurrence  2 x 9 x     Unmeasured Stool Occurrence  1 x 2 x           Physical Exam:       General Appearance:    Alert, cooperative, in no acute distress   Head:    Normocephalic, without obvious abnormality, atraumatic   Eyes:            Lids and lashes normal, conjunctivae and sclerae normal, no   icterus, no pallor, corneas clear, PERRLA   Ears:    Ears appear intact with no abnormalities noted   Throat:   No oral lesions, no thrush, oral mucosa moist   Neck:   No adenopathy, supple, trachea midline, no thyromegaly, no   carotid bruit, no JVD   Back:     No tenderness to percussion or palpation, range of motion   normal   Lungs:     Clear to auscultation,respirations regular, even and unlabored. No wheezing, no ronchi and no crackles.    Heart:    Regular rhythm and normal " rate, normal S1 and S2, no            murmur, no gallop, no rub, no click   Chest Wall:    No abnormalities observed   Abdomen:     Normal bowel sounds, no masses, no organomegaly, soft        non-tender, non-distended, no guarding, no rebound                tenderness   Rectal:     Deferred   Extremities:  Quadriplegia , hands contracted    Pulses:   Pulses palpable and equal bilaterally   Skin:  Chronic ongoing coccyx.  Wound    Lymph nodes:   No palpable adenopathy   Neurologic:   Awake, alert and oriented x 3. Following commands.         Results:      Results from last 7 days  Lab Units 02/08/18  0310 02/07/18  0106 02/06/18  1314   WBC 10*3/mm3 3.48* 3.85* 4.77     Lab Results   Component Value Date    NEUTROABS 1.47 02/08/2018         Results from last 7 days  Lab Units 02/08/18  0310   CREATININE mg/dL 1.55*         Results from last 7 days  Lab Units 02/08/18  0310 02/07/18  0106 02/06/18  1832   CRP mg/dL 8.16* 10.77* 9.47*       Results Review:    I have personally reviewed laboratory data, culture results, radiology studies and antimicrobial therapy.    Hospital Medications (active)       Dose Frequency Start End    acetaminophen (TYLENOL) tablet 650 mg 650 mg Every 6 Hours PRN 2/7/2018     Sig - Route: Take 2 tablets by mouth Every 6 (Six) Hours As Needed for Mild Pain . - Oral    Cosign for Ordering: Required by Apollo Amado MD    cefepime (MAXIPIME) IVPB 1 g/50ml D5W (premix) 1 g Every 12 Hours 2/8/2018 2/15/2018    Sig - Route: Infuse 50 mL into a venous catheter Every 12 (Twelve) Hours. - Intravenous    Notes to Pharmacy: Continued dosing if no reaction from graded challenge. If reaction has occurred please discontinue dosing.    Cefepime HCl (MAXIPIME) 1 mg in sodium chloride 0.9 % 50 mL IVPB  Once 2/7/2018 2/7/2018    Sig - Route: Infuse  into a venous catheter 1 (One) Time. - Intravenous    Cefepime HCl (MAXIPIME) 10 mg in sodium chloride 0.9 % 50 mL IVPB  Once 2/7/2018 2/7/2018    Sig - Route:  "Infuse  into a venous catheter 1 (One) Time. - Intravenous    Cefepime HCl (MAXIPIME) 100 mg in sodium chloride 0.9 % 50 mL IVPB  Once 2/7/2018 2/7/2018    Sig - Route: Infuse  into a venous catheter 1 (One) Time. - Intravenous    Cefepime HCl (MAXIPIME) 889 mg in sodium chloride 0.9 % 100 mL IVPB  Once 2/7/2018 2/7/2018    Sig - Route: Infuse  into a venous catheter 1 (One) Time. - Intravenous    ferrous sulfate tablet 325 mg 325 mg 2 Times Daily With Meals 2/7/2018     Sig - Route: Take 1 tablet by mouth 2 (Two) Times a Day With Meals. - Oral    folic acid (FOLVITE) tablet 1 mg 1 mg Daily 2/7/2018     Sig - Route: Take 1 tablet by mouth Daily. - Oral    heparin (porcine) 5000 UNIT/ML injection 5,000 Units 5,000 Units Every 12 Hours Scheduled 2/6/2018     Sig - Route: Inject 1 mL under the skin Every 12 (Twelve) Hours. - Subcutaneous    Cosign for Ordering: Required by Apollo Amado MD    Magnesium Sulfate 2 gram infusion- Mg 1.6 - 1.9 mg/dL 2 g As Needed 2/6/2018     Sig - Route: Infuse 50 mL into a venous catheter As Needed (Mg 1.6 - 1.9 mg/dL). - Intravenous    Linked Group 1:  \"Or\" Linked Group Details        Magnesium Sulfate 2 gram infusion- Mg 1.6 - 1.9 mg/dL 2 g Once 2/7/2018 2/7/2018    Sig - Route: Infuse 50 mL into a venous catheter 1 (One) Time. - Intravenous    magnesium sulfate 3 gram infusion (1gm x 3) - Mg 1.1 - 1.5 mg/dL 1 g As Needed 2/6/2018     Sig - Route: Infuse 100 mL into a venous catheter As Needed (Mg 1.1 - 1.5 mg/dL). - Intravenous    Linked Group 1:  \"Or\" Linked Group Details        magnesium sulfate 4 gram infusion - Mg less than or equal to 1mg/dL 4 g As Needed 2/6/2018     Sig - Route: Infuse 100 mL into a venous catheter As Needed (Mg less than or equal to 1mg/dL). - Intravenous    Linked Group 1:  \"Or\" Linked Group Details        nitroglycerin (NITROSTAT) SL tablet 0.4 mg 0.4 mg Every 5 Minutes PRN 2/6/2018     Sig - Route: Place 1 tablet under the tongue Every 5 (Five) Minutes As " "Needed for Chest Pain (if systolic BP greater than 100 mm/Hg.). - Sublingual    Cosign for Ordering: Required by Apollo Amado MD    oseltamivir (TAMIFLU) 6 MG/ML suspension 30 mg 30 mg Every 12 Hours Scheduled 2/7/2018 2/12/2018    Sig - Route: Take 5 mL by mouth Every 12 (Twelve) Hours. - Oral    potassium chloride (K-DUR,KLOR-CON) CR tablet 40 mEq 40 mEq As Needed 2/6/2018     Sig - Route: Take 2 tablets by mouth As Needed (potassium replacement.  see admin instructions). - Oral    Linked Group 2:  \"Or\" Linked Group Details        potassium chloride (KLOR-CON) packet 40 mEq 40 mEq As Needed 2/6/2018     Sig - Route: Take 40 mEq by mouth As Needed (potassium replacement, see admin instructions). - Oral    Linked Group 2:  \"Or\" Linked Group Details        potassium chloride 10 mEq in 100 mL IVPB 10 mEq Every 1 Hour PRN 2/6/2018     Sig - Route: Infuse 100 mL into a venous catheter Every 1 (One) Hour As Needed (potassium protocol PERIPHERAL - see admin instructions). - Intravenous    Linked Group 2:  \"Or\" Linked Group Details        promethazine (PHENERGAN) 6.25 mg in sodium chloride 0.9 % 50 mL 6.25 mg Every 6 Hours PRN 2/6/2018     Sig - Route: Infuse 6.25 mg into a venous catheter Every 6 (Six) Hours As Needed for Nausea or Vomiting (hold for oversedation). - Intravenous    sodium chloride 0.9 % flush 1-10 mL 1-10 mL As Needed 2/6/2018     Sig - Route: Infuse 1-10 mL into a venous catheter As Needed for Line Care. - Intravenous    Cosign for Ordering: Required by Apollo Amado MD    sodium chloride 0.9 % infusion 100 mL/hr Continuous 2/6/2018     Sig - Route: Infuse 100 mL/hr into a venous catheter Continuous. - Intravenous    sodium hypochlorite (DAKIN'S 1/4 STRENGTH) 0.125 % topical solution 0.125% solution  2 Times Daily 2/7/2018     Sig - Route: Apply  topically 2 (Two) Times a Day. - Topical    vitamin B-12 (CYANOCOBALAMIN) tablet 1,000 mcg 1,000 mcg Daily 2/7/2018     Sig - Route: Take 1 tablet by mouth " Daily. - Oral    Cefepime HCl (MAXIPIME) 1 g in sodium chloride 0.9 % 100 mL IVPB (Discontinued)  Every 12 Hours 2/8/2018 2/7/2018    Sig - Route: Infuse  into a venous catheter Every 12 (Twelve) Hours. - Intravenous    Reason for Discontinue: Reorder    Pharmacy Consult - Pharmacy to dose (Discontinued)  Continuous PRN 2/6/2018 2/7/2018    Sig - Route: Continuous As Needed for Consult. - Does not apply    Reason for Discontinue: Duplicate order    Cosign for Ordering: Required by Apollo Amado MD    Pharmacy Consult - Pharmacy to dose (Discontinued)  Continuous PRN 2/7/2018 2/7/2018    Sig - Route: Continuous As Needed for Consult. - Does not apply    Reason for Discontinue: Duplicate order    Pharmacy Consult (Discontinued)  Continuous PRN 2/7/2018 2/7/2018    Sig - Route: Continuous As Needed for Consult. - Does not apply    Reason for Discontinue: *Therapy completed    Pharmacy to Dose Zosyn (Discontinued)  Continuous PRN 2/6/2018 2/7/2018    Sig - Route: Continuous As Needed for Consult. - Does not apply    Reason for Discontinue: Duplicate order    Cosign for Ordering: Required by Apollo Amado MD    piperacillin-tazobactam (ZOSYN) 3.375 g/100 mL 0.9% NS IVPB (mbp) (Discontinued) 3.375 g Every 8 Hours 2/7/2018 2/7/2018    Sig - Route: Infuse 100 mL into a venous catheter Every 8 (Eight) Hours. - Intravenous            Cultures:    Blood Culture   Date Value Ref Range Status   02/06/2018 No growth at 24 hours  Preliminary   02/06/2018 No growth at 24 hours  Preliminary   02/06/2018 No growth at 24 hours  Preliminary           Assessment/Plan     ASSESSMENT:     #1 Sepsis  #2 UTI   #3 Influenza     PLAN:    Patient seems to be more comfortable today and states she is feeling better.  CRP showing improvement from 10.77 down to 8.16 with persistent leukopenia.  No fever reported overnight.  Culture still in progress.     Patient presented with  ongoing cough, shortness of breath over 2 days.  He remembers  recently diagnosed with influenza.  Positive urinalysis from 2/6/18 with culture in process.  CRP 10.7 with neutropenia.  Chest x-ray from 2/6/18 unremarkable.  Blood cultures from 2/6/18 show no growth so far.  Influenza 2/6/18 positive.  Normal lactic acid.     Cefepime pharmacy to dose empirically until culture finalization and susceptibilities are available.     Zosyn was discontinued.     Neutropenia could be related to acute influenza.     We'll continue to follow closely and adjust therapy when cultures and activities are available.     CRP in the a.m.       Patient's findings and recommendations were discussed with patient and nursing staff    Code Status: Full Code    Milagro Vasquez PA-C  02/08/18  11:15 AM

## 2018-02-08 NOTE — PROGRESS NOTES
Subjective     History:   Awais Dorman is a 27 y.o. female admitted on 2/6/2018 secondary to Sepsis     Procedures: None    Patient seen and examined with KENYATTA Mccloud. Awake and alert. States she feels slightly improved from yesterday. Reports intermittent dyspnea. Reports dry cough. States she feels slightly swollen from the IVF's. Denies CP. No acute events overnight per RN.     History taken from: patient, chart, and RN.      Objective     Vital Signs  Temp:  [97.4 °F (36.3 °C)-100.5 °F (38.1 °C)] 97.7 °F (36.5 °C)  Heart Rate:  [83-96] 86  Resp:  [18] 18  BP: ()/(66-84) 112/75    Intake/Output Summary (Last 24 hours) at 02/08/18 1712  Last data filed at 02/08/18 0300   Gross per 24 hour   Intake              960 ml   Output                0 ml   Net              960 ml         Physical Exam:  General:    Awake, alert, in no acute distress   Heart:      Normal S1 and S2. Regular rate and rhythm. No significant murmur, rubs or gallops appreciated.   Lungs:     Respirations regular, even and unlabored. Lungs clear to auscultation B/L. No wheezes, rales or rhonchi.   Abdomen:   Soft and nontender. No guarding, rebound tenderness or  organomegaly noted. Bowel sounds present x 4.   Extremities:  B/L UE and LE contractures. Limited movement of UE's. No movement or sensation in B/L LE's.      Results Review:      Results from last 7 days  Lab Units 02/08/18  0310 02/07/18  0106 02/06/18  1314   WBC 10*3/mm3 3.48* 3.85* 4.77   HEMOGLOBIN g/dL 7.8* 7.8* 8.0*   PLATELETS 10*3/mm3 257 266 250       Results from last 7 days  Lab Units 02/08/18  0310 02/07/18  0106 02/06/18  1314   SODIUM mmol/L 139 140 140   POTASSIUM mmol/L 4.1 4.0 3.3*   CHLORIDE mmol/L 112 116* 115*   CO2 mmol/L 15.3* 16.4* 18.2*   BUN mg/dL 11 15 17   CREATININE mg/dL 1.55* 1.41* 1.49*   CALCIUM mg/dL 7.1* 7.1* 7.3*   GLUCOSE mg/dL 70 87 109       Results from last 7 days  Lab Units 02/06/18  1314   BILIRUBIN mg/dL 0.3   ALK PHOS U/L 57   AST  (SGOT) U/L 10   ALT (SGPT) U/L 8*       Results from last 7 days  Lab Units 02/08/18  0310 02/07/18  0106 02/06/18  1832   MAGNESIUM mg/dL 2.5 1.6* 1.5*               Imaging Results (last 24 hours)     ** No results found for the last 24 hours. **            Medications:    cefepime 1 g Intravenous Q12H   ferrous sulfate 325 mg Oral BID With Meals   folic acid 1 mg Oral Daily   heparin (porcine) 5,000 Units Subcutaneous Q12H   multivitamin 1 tablet Oral Daily   oseltamivir 30 mg Oral Q12H   sodium hypochlorite  Topical BID   vitamin B-12 1,000 mcg Oral Daily              Assessment/Plan   Sepsis: Likely 2/2 influenza A and complicated UTI. BP remains borderline low but overall stable. Afebrile today. Remains leukopenic. CRP improved today. Currently on Tamiflu and Cefepime per ID recs. Blood cultures with NGTD. Urine culture in process. Cont to follow cultures and repeat labs in the AM. ID input appreciated.     Influenza A: Cont Tamiflu and isolation.    Complicated UTI: Pt requires straight caths at home. Cont Cefepime and follow culture.     Acute hypoxic respiratory failure: Likely 2/2 above. Currently stable off supplemental O2. Will stop IVF's.     ALBARO on suspected CKD III: Cr has trended upward today. Pt states she feels dyspneic and swollen. Will stop IVF's. Repeat labs in the AM.     Hypokalemia: Resolved. Mg is now >2. Cont electrolyte replacement protocols and repeat labs in the AM.     Macrocytic anemia with hx of iron deficiency: Occult blood is negative. Cont iron, B12 and folic acid. Repeat CBC in the AM.     Multiple pressure ulcers: Present on admission. Wound care following. Added MVI.     Hx of paraplegia: Pt has limited movement in upper extremities following a C5 injury as well. Cont supportive care.     DVT PPX: SQ heparin    Pt is at high risk 2/2 sepsis, influenza, complicated UTI, acute hypoxic resp failure, ALBARO on CKD, anemia and paraplegia.       Josafat Campbell DO  02/08/18  5:12  PM

## 2018-02-09 VITALS
DIASTOLIC BLOOD PRESSURE: 67 MMHG | HEART RATE: 70 BPM | WEIGHT: 101.3 LBS | TEMPERATURE: 98.1 F | SYSTOLIC BLOOD PRESSURE: 91 MMHG | OXYGEN SATURATION: 94 % | RESPIRATION RATE: 16 BRPM | HEIGHT: 66 IN | BODY MASS INDEX: 16.28 KG/M2

## 2018-02-09 LAB
ANION GAP SERPL CALCULATED.3IONS-SCNC: 1 MMOL/L (ref 3.6–11.2)
BACTERIA SPEC AEROBE CULT: ABNORMAL
BACTERIA SPEC AEROBE CULT: ABNORMAL
BASOPHILS # BLD AUTO: 0.02 10*3/MM3 (ref 0–0.3)
BASOPHILS NFR BLD AUTO: 0.5 % (ref 0–2)
BUN BLD-MCNC: 9 MG/DL (ref 7–21)
BUN/CREAT SERPL: 5.7 (ref 7–25)
CALCIUM SPEC-SCNC: 7.3 MG/DL (ref 7.7–10)
CHLORIDE SERPL-SCNC: 114 MMOL/L (ref 99–112)
CO2 SERPL-SCNC: 22 MMOL/L (ref 24.3–31.9)
CREAT BLD-MCNC: 1.57 MG/DL (ref 0.43–1.29)
CRP SERPL-MCNC: 7.46 MG/DL (ref 0–0.99)
DEPRECATED RDW RBC AUTO: 55.6 FL (ref 37–54)
EOSINOPHIL # BLD AUTO: 0.05 10*3/MM3 (ref 0–0.7)
EOSINOPHIL NFR BLD AUTO: 1.3 % (ref 0–5)
ERYTHROCYTE [DISTWIDTH] IN BLOOD BY AUTOMATED COUNT: 16.3 % (ref 11.5–14.5)
GFR SERPL CREATININE-BSD FRML MDRD: 40 ML/MIN/1.73
GLUCOSE BLD-MCNC: 72 MG/DL (ref 70–110)
GLUCOSE BLDC GLUCOMTR-MCNC: 77 MG/DL (ref 70–130)
HCT VFR BLD AUTO: 30 % (ref 37–47)
HGB BLD-MCNC: 8.8 G/DL (ref 12–16)
IMM GRANULOCYTES # BLD: 0.02 10*3/MM3 (ref 0–0.03)
IMM GRANULOCYTES NFR BLD: 0.5 % (ref 0–0.5)
LYMPHOCYTES # BLD AUTO: 1.42 10*3/MM3 (ref 1–3)
LYMPHOCYTES NFR BLD AUTO: 36.7 % (ref 21–51)
MAGNESIUM SERPL-MCNC: 2 MG/DL (ref 1.7–2.6)
MCH RBC QN AUTO: 28.9 PG (ref 27–33)
MCHC RBC AUTO-ENTMCNC: 29.3 G/DL (ref 33–37)
MCV RBC AUTO: 98.4 FL (ref 80–94)
MONOCYTES # BLD AUTO: 0.31 10*3/MM3 (ref 0.1–0.9)
MONOCYTES NFR BLD AUTO: 8 % (ref 0–10)
NEUTROPHILS # BLD AUTO: 2.05 10*3/MM3 (ref 1.4–6.5)
NEUTROPHILS NFR BLD AUTO: 53 % (ref 30–70)
OSMOLALITY SERPL CALC.SUM OF ELEC: 271 MOSM/KG (ref 273–305)
PLATELET # BLD AUTO: 253 10*3/MM3 (ref 130–400)
PMV BLD AUTO: 9.2 FL (ref 6–10)
POTASSIUM BLD-SCNC: 3.8 MMOL/L (ref 3.5–5.3)
RBC # BLD AUTO: 3.05 10*6/MM3 (ref 4.2–5.4)
SODIUM BLD-SCNC: 137 MMOL/L (ref 135–153)
WBC NRBC COR # BLD: 3.87 10*3/MM3 (ref 4.5–12.5)

## 2018-02-09 PROCEDURE — 85025 COMPLETE CBC W/AUTO DIFF WBC: CPT | Performed by: INTERNAL MEDICINE

## 2018-02-09 PROCEDURE — 82962 GLUCOSE BLOOD TEST: CPT

## 2018-02-09 PROCEDURE — 94799 UNLISTED PULMONARY SVC/PX: CPT

## 2018-02-09 PROCEDURE — 99239 HOSP IP/OBS DSCHRG MGMT >30: CPT | Performed by: INTERNAL MEDICINE

## 2018-02-09 PROCEDURE — 83735 ASSAY OF MAGNESIUM: CPT | Performed by: INTERNAL MEDICINE

## 2018-02-09 PROCEDURE — 80048 BASIC METABOLIC PNL TOTAL CA: CPT | Performed by: INTERNAL MEDICINE

## 2018-02-09 PROCEDURE — 25010000002 CEFEPIME PER 500 MG: Performed by: INTERNAL MEDICINE

## 2018-02-09 PROCEDURE — 86140 C-REACTIVE PROTEIN: CPT | Performed by: PHYSICIAN ASSISTANT

## 2018-02-09 RX ORDER — OSELTAMIVIR PHOSPHATE 6 MG/ML
30 FOR SUSPENSION ORAL EVERY 12 HOURS SCHEDULED
Qty: 60 ML | Refills: 0 | Status: SHIPPED | OUTPATIENT
Start: 2018-02-09 | End: 2018-02-15

## 2018-02-09 RX ORDER — CEFDINIR 300 MG/1
300 CAPSULE ORAL EVERY 12 HOURS SCHEDULED
Qty: 7 CAPSULE | Refills: 0 | Status: SHIPPED | OUTPATIENT
Start: 2018-02-09 | End: 2018-02-13

## 2018-02-09 RX ORDER — MULTIVITAMIN
1 TABLET ORAL DAILY
Qty: 30 TABLET | Refills: 0 | Status: ON HOLD | OUTPATIENT
Start: 2018-02-10 | End: 2018-06-17

## 2018-02-09 RX ORDER — FERROUS SULFATE 325(65) MG
325 TABLET ORAL 2 TIMES DAILY WITH MEALS
Qty: 60 TABLET | Refills: 0 | Status: ON HOLD | OUTPATIENT
Start: 2018-02-09 | End: 2018-06-17

## 2018-02-09 RX ORDER — FOLIC ACID 1 MG/1
1 TABLET ORAL DAILY
Qty: 30 TABLET | Refills: 0 | Status: ON HOLD | OUTPATIENT
Start: 2018-02-10 | End: 2018-06-17

## 2018-02-09 RX ORDER — CEFDINIR 300 MG/1
300 CAPSULE ORAL EVERY 12 HOURS SCHEDULED
Status: DISCONTINUED | OUTPATIENT
Start: 2018-02-09 | End: 2018-02-09 | Stop reason: HOSPADM

## 2018-02-09 RX ADMIN — Medication 1 TABLET: at 09:11

## 2018-02-09 RX ADMIN — SODIUM HYPOCHLORITE: 1.25 SOLUTION TOPICAL at 05:28

## 2018-02-09 RX ADMIN — CEFDINIR 300 MG: 300 CAPSULE ORAL at 10:21

## 2018-02-09 RX ADMIN — Medication 1000 MCG: at 09:11

## 2018-02-09 RX ADMIN — FERROUS SULFATE TAB 325 MG (65 MG ELEMENTAL FE) 325 MG: 325 (65 FE) TAB at 09:07

## 2018-02-09 RX ADMIN — FOLIC ACID 1 MG: 1 TABLET ORAL at 09:11

## 2018-02-09 RX ADMIN — OSELTAMIVIR PHOSPHATE 30 MG: 6 POWDER, FOR SUSPENSION ORAL at 09:11

## 2018-02-09 RX ADMIN — SODIUM HYPOCHLORITE: 1.25 SOLUTION TOPICAL at 09:25

## 2018-02-09 RX ADMIN — CEFEPIME HYDROCHLORIDE 1 G: 1 INJECTION, SOLUTION INTRAVENOUS at 09:37

## 2018-02-09 NOTE — DISCHARGE INSTRUCTIONS
Wound care- Coccyx Right and Left Buttock Cleanse with Dakins solution 1/4 strength pack with moist fluffed gauze and cover with silicone border dressing

## 2018-02-09 NOTE — PROGRESS NOTES
Discharge Planning Assessment   Yovany     Patient Name: Awais Dorman  MRN: [id number]  Today's Date: 2/9/2018    Admit Date: 2/6/2018       Discharge Plan       02/09/18 1303    Final Note    Final Note Pt is being discharged home on this date. SS spoke with pt's spouse, who states pt has no needs at this time. Pt's spouse states that they do not need home health services at this time. No further needs at this time.         Discharge Placement     No information found        Expected Discharge Date and Time     Expected Discharge Date Expected Discharge Time    Feb 9, 2018           Alayna David

## 2018-02-09 NOTE — PROGRESS NOTES
"  I have personally seen and examined the patient today and discussed overnight interval progress and pertinent issues with nursing staff.    Subjective:    Urine culture finalized on 2/9/2018 as greater than 100,000 colonies of Escherichia coli.  Patient seems to be stable and feeling better.  Continues to be on Tamiflu to complete a 5 day course.  CRP level showing improvement was slightly improved but persistent leukopenia.    History taken from: patient chart RN      Vital Signs    BP 91/67 (BP Location: Right arm, Patient Position: Lying)  Pulse 70  Temp 98.1 °F (36.7 °C) (Oral)   Resp 16  Ht 167.6 cm (66\")  Wt 45.9 kg (101 lb 4.8 oz)  SpO2 94%  BMI 16.35 kg/m2    Temp:  [97.7 °F (36.5 °C)-98.4 °F (36.9 °C)] 98.1 °F (36.7 °C)      Intake/Output Summary (Last 24 hours) at 02/09/18 1050  Last data filed at 02/09/18 0937   Gross per 24 hour   Intake              930 ml   Output                0 ml   Net              930 ml     Intake & Output (last 3 days)       02/06 0701 - 02/07 0700 02/07 0701 - 02/08 0700 02/08 0701 - 02/09 0700 02/09 0701 - 02/10 0700    P.O. 120 780 180     I.V. (mL/kg) 875 (18.7) 900 (19.6) 700 (15.2)     IV Piggyback 1543   50    Total Intake(mL/kg) 2538 (54.4) 1680 (36.6) 880 (19.2) 50 (1.1)    Net +2538 +1680 +880 +50            Unmeasured Urine Occurrence 2 x 9 x 10 x     Unmeasured Stool Occurrence 1 x 2 x 3 x           Physical Exam:       General Appearance:    Alert, cooperative, in no acute distress   Head:    Normocephalic, without obvious abnormality, atraumatic   Eyes:            Lids and lashes normal, conjunctivae and sclerae normal, no   icterus, no pallor, corneas clear, PERRLA   Ears:    Ears appear intact with no abnormalities noted   Throat:   No oral lesions, no thrush, oral mucosa moist   Neck:   No adenopathy, supple, trachea midline, no thyromegaly, no   carotid bruit, no JVD   Back:     No tenderness to percussion or palpation, range of motion   normal "   Lungs:     Clear to auscultation,respirations regular, even and unlabored. No wheezing, no ronchi and no crackles.    Heart:    Regular rhythm and normal rate, normal S1 and S2, no            murmur, no gallop, no rub, no click   Chest Wall:    No abnormalities observed   Abdomen:     Normal bowel sounds, no masses, no organomegaly, soft        non-tender, non-distended, no guarding, no rebound                tenderness   Rectal:     Deferred   Extremities:  Quadriplegia , hands contracted    Pulses:   Pulses palpable and equal bilaterally   Skin:  Chronic ongoing coccyx.  Wound    Lymph nodes:   No palpable adenopathy   Neurologic:   Awake, alert and oriented x 3. Following commands.         Results:      Results from last 7 days  Lab Units 02/09/18  0128 02/08/18  0310 02/07/18  0106 02/06/18  1314   WBC 10*3/mm3 3.87* 3.48* 3.85* 4.77     Lab Results   Component Value Date    NEUTROABS 2.05 02/09/2018         Results from last 7 days  Lab Units 02/09/18  0128   CREATININE mg/dL 1.57*         Results from last 7 days  Lab Units 02/09/18  0128 02/08/18  0310 02/07/18  0106   CRP mg/dL 7.46* 8.16* 10.77*       Results Review:    I have personally reviewed laboratory data, culture results, radiology studies and antimicrobial therapy.    Hospital Medications (active)       Dose Frequency Start End    acetaminophen (TYLENOL) tablet 650 mg 650 mg Every 6 Hours PRN 2/7/2018     Sig - Route: Take 2 tablets by mouth Every 6 (Six) Hours As Needed for Mild Pain . - Oral    Cosign for Ordering: Required by Apollo Amado MD    cefepime (MAXIPIME) IVPB 1 g/50ml D5W (premix) 1 g Every 12 Hours 2/8/2018 2/15/2018    Sig - Route: Infuse 50 mL into a venous catheter Every 12 (Twelve) Hours. - Intravenous    Notes to Pharmacy: Continued dosing if no reaction from graded challenge. If reaction has occurred please discontinue dosing.    Cefepime HCl (MAXIPIME) 1 mg in sodium chloride 0.9 % 50 mL IVPB  Once 2/7/2018 2/7/2018    Sig  "- Route: Infuse  into a venous catheter 1 (One) Time. - Intravenous    Cefepime HCl (MAXIPIME) 10 mg in sodium chloride 0.9 % 50 mL IVPB  Once 2/7/2018 2/7/2018    Sig - Route: Infuse  into a venous catheter 1 (One) Time. - Intravenous    Cefepime HCl (MAXIPIME) 100 mg in sodium chloride 0.9 % 50 mL IVPB  Once 2/7/2018 2/7/2018    Sig - Route: Infuse  into a venous catheter 1 (One) Time. - Intravenous    Cefepime HCl (MAXIPIME) 889 mg in sodium chloride 0.9 % 100 mL IVPB  Once 2/7/2018 2/7/2018    Sig - Route: Infuse  into a venous catheter 1 (One) Time. - Intravenous    ferrous sulfate tablet 325 mg 325 mg 2 Times Daily With Meals 2/7/2018     Sig - Route: Take 1 tablet by mouth 2 (Two) Times a Day With Meals. - Oral    folic acid (FOLVITE) tablet 1 mg 1 mg Daily 2/7/2018     Sig - Route: Take 1 tablet by mouth Daily. - Oral    heparin (porcine) 5000 UNIT/ML injection 5,000 Units 5,000 Units Every 12 Hours Scheduled 2/6/2018     Sig - Route: Inject 1 mL under the skin Every 12 (Twelve) Hours. - Subcutaneous    Cosign for Ordering: Required by Apollo Amado MD    Magnesium Sulfate 2 gram infusion- Mg 1.6 - 1.9 mg/dL 2 g As Needed 2/6/2018     Sig - Route: Infuse 50 mL into a venous catheter As Needed (Mg 1.6 - 1.9 mg/dL). - Intravenous    Linked Group 1:  \"Or\" Linked Group Details        Magnesium Sulfate 2 gram infusion- Mg 1.6 - 1.9 mg/dL 2 g Once 2/7/2018 2/7/2018    Sig - Route: Infuse 50 mL into a venous catheter 1 (One) Time. - Intravenous    magnesium sulfate 3 gram infusion (1gm x 3) - Mg 1.1 - 1.5 mg/dL 1 g As Needed 2/6/2018     Sig - Route: Infuse 100 mL into a venous catheter As Needed (Mg 1.1 - 1.5 mg/dL). - Intravenous    Linked Group 1:  \"Or\" Linked Group Details        magnesium sulfate 4 gram infusion - Mg less than or equal to 1mg/dL 4 g As Needed 2/6/2018     Sig - Route: Infuse 100 mL into a venous catheter As Needed (Mg less than or equal to 1mg/dL). - Intravenous    Linked Group 1:  \"Or\" " "Linked Group Details        nitroglycerin (NITROSTAT) SL tablet 0.4 mg 0.4 mg Every 5 Minutes PRN 2/6/2018     Sig - Route: Place 1 tablet under the tongue Every 5 (Five) Minutes As Needed for Chest Pain (if systolic BP greater than 100 mm/Hg.). - Sublingual    Cosign for Ordering: Required by Apollo Amado MD    oseltamivir (TAMIFLU) 6 MG/ML suspension 30 mg 30 mg Every 12 Hours Scheduled 2/7/2018 2/12/2018    Sig - Route: Take 5 mL by mouth Every 12 (Twelve) Hours. - Oral    potassium chloride (K-DUR,KLOR-CON) CR tablet 40 mEq 40 mEq As Needed 2/6/2018     Sig - Route: Take 2 tablets by mouth As Needed (potassium replacement.  see admin instructions). - Oral    Linked Group 2:  \"Or\" Linked Group Details        potassium chloride (KLOR-CON) packet 40 mEq 40 mEq As Needed 2/6/2018     Sig - Route: Take 40 mEq by mouth As Needed (potassium replacement, see admin instructions). - Oral    Linked Group 2:  \"Or\" Linked Group Details        potassium chloride 10 mEq in 100 mL IVPB 10 mEq Every 1 Hour PRN 2/6/2018     Sig - Route: Infuse 100 mL into a venous catheter Every 1 (One) Hour As Needed (potassium protocol PERIPHERAL - see admin instructions). - Intravenous    Linked Group 2:  \"Or\" Linked Group Details        promethazine (PHENERGAN) 6.25 mg in sodium chloride 0.9 % 50 mL 6.25 mg Every 6 Hours PRN 2/6/2018     Sig - Route: Infuse 6.25 mg into a venous catheter Every 6 (Six) Hours As Needed for Nausea or Vomiting (hold for oversedation). - Intravenous    sodium chloride 0.9 % flush 1-10 mL 1-10 mL As Needed 2/6/2018     Sig - Route: Infuse 1-10 mL into a venous catheter As Needed for Line Care. - Intravenous    Cosign for Ordering: Required by Apollo Amado MD    sodium chloride 0.9 % infusion 100 mL/hr Continuous 2/6/2018     Sig - Route: Infuse 100 mL/hr into a venous catheter Continuous. - Intravenous    sodium hypochlorite (DAKIN'S 1/4 STRENGTH) 0.125 % topical solution 0.125% solution  2 Times Daily 2/7/2018 "     Sig - Route: Apply  topically 2 (Two) Times a Day. - Topical    vitamin B-12 (CYANOCOBALAMIN) tablet 1,000 mcg 1,000 mcg Daily 2/7/2018     Sig - Route: Take 1 tablet by mouth Daily. - Oral    Cefepime HCl (MAXIPIME) 1 g in sodium chloride 0.9 % 100 mL IVPB (Discontinued)  Every 12 Hours 2/8/2018 2/7/2018    Sig - Route: Infuse  into a venous catheter Every 12 (Twelve) Hours. - Intravenous    Reason for Discontinue: Reorder    Pharmacy Consult - Pharmacy to dose (Discontinued)  Continuous PRN 2/6/2018 2/7/2018    Sig - Route: Continuous As Needed for Consult. - Does not apply    Reason for Discontinue: Duplicate order    Cosign for Ordering: Required by Apollo Amado MD    Pharmacy Consult - Pharmacy to dose (Discontinued)  Continuous PRN 2/7/2018 2/7/2018    Sig - Route: Continuous As Needed for Consult. - Does not apply    Reason for Discontinue: Duplicate order    Pharmacy Consult (Discontinued)  Continuous PRN 2/7/2018 2/7/2018    Sig - Route: Continuous As Needed for Consult. - Does not apply    Reason for Discontinue: *Therapy completed    Pharmacy to Dose Zosyn (Discontinued)  Continuous PRN 2/6/2018 2/7/2018    Sig - Route: Continuous As Needed for Consult. - Does not apply    Reason for Discontinue: Duplicate order    Cosign for Ordering: Required by Apollo Amado MD    piperacillin-tazobactam (ZOSYN) 3.375 g/100 mL 0.9% NS IVPB (mbp) (Discontinued) 3.375 g Every 8 Hours 2/7/2018 2/7/2018    Sig - Route: Infuse 100 mL into a venous catheter Every 8 (Eight) Hours. - Intravenous            Cultures:    Blood Culture   Date Value Ref Range Status   02/06/2018 No growth at 24 hours  Preliminary   02/06/2018 No growth at 24 hours  Preliminary   02/06/2018 No growth at 24 hours  Preliminary           Assessment/Plan     ASSESSMENT:     #1 Sepsis  #2 UTI   #3 Influenza     PLAN:    Urine culture finalized on 2/9/2018 as greater than 100,000 colonies of Escherichia coli and based on susceptibility pattern  antibiotic therapy was de-escalated from cefepime to Omnicef 300 mg by mouth twice a day through 2/12/2018.  Patient seems to be stable and feeling better.  Continues to be on Tamiflu to complete a 5 day course.  CRP level showing improvement was slightly improved but persistent leukopenia.     Blood cultures from 2/6/18 show no growth so far.  Influenza 2/6/18 positive.  Normal lactic acid.     Neutropenia could be related to acute influenza.     CRP in the a.m.     Patient's findings and recommendations were discussed with patient and nursing staff    Code Status: Full Code    Milagro Vasquez PA-C  02/09/18  10:50 AM

## 2018-02-09 NOTE — PLAN OF CARE
Problem: Patient Care Overview (Adult)  Goal: Plan of Care Review  Outcome: Ongoing (interventions implemented as appropriate)   02/08/18 0239   Coping/Psychosocial Response Interventions   Plan Of Care Reviewed With patient

## 2018-02-09 NOTE — DISCHARGE SUMMARY
Date of Admission: 2/6/2018    Date of Discharge: 2/9/2018    PCP: Zara Li M.D.    Admission Diagnosis:   Please see admission H&P    Discharge Diagnosis:   Sepsis  Influenza A  Complicated E coli UTI  Acute hypoxic respiratory failure  ALBARO on suspected CKD III  Hypokalemia  Hypomagnesemia  Macrocytic anemia with hx of iron deficiency  Multiple pressure ulcers  Hx of paraplegia     Procedures Performed:  None     Consults:   Consults     Date and Time Order Name Status Description    2/7/2018 1030 Inpatient Consult to Infectious Diseases Completed     2/6/2018 1635 Hospitalist (on-call MD unless specified)              History of Present Illness:  Awais Dorman is a 27 y.o. female who presented to [facility] ED via EMS from her PCP's office for evaluation of hypoxia. Per reports, her O2% was 84% in their office. Please see admission H&P for complete details.     In the ED, she was borderline hypotensive with workup revealing sepsis, acute hypoxic resp failure, influenza A and UTI. Routine labs also revealed an elevated Cr, macrocytic anemia and hypokalemia. Cultures were obtained and IV antibiotics initiated. She was also given a dose of Tamiflu.        Hospital Course  Awais Dorman was admitted to the med/surg floor with telemetry for further evaluation and treatment. She was continued on Tamiflu and Zosyn in addition to being placed in isolation. She was continued on supplemental O2 and placed on maintenance IVF's. ID was consulted and changed Zosyn to Cefepime.     She was initially febrile upon admission but this quickly resolved. Repeat labs revealed onset of leukopenia but her CRP began improving. Her BP remained borderline low but overall stable. Further history revealed a hx of iron deficiency. Occult blood was negative with iron, B12 and folic acid noted to be low this admission. She was started on oral supplementation and encouraged to follow up with her PCP for repeat labs. Multiple pressure ulcers were  noted on admission and wound care was consulted. She was placed on a MVI as well. Her Cr initially improved with IVF's but she began feeling dyspneic and swollen and the fluids were stopped. Her Cr trended back upward after the fluids were stopped and PO intake was encouraged. Review of labs from a recent hospitalization at [facility] revealed suspected CKD III with an elevated Cr there as well.     Mrs. Dorman was seen and examined with KENYATTA Brown on 2/9/18. She remained hemodynamically stable. Routine labs revealed a stable leukopenia, improving CRP and an elevated but overall stable Cr. Her urine culture was finalized as E coli and ID deescalated her from Cefepime to Omnicef. She was maintaining O2 saturations in the 90's on RA. She felt much improved from upon admission and was anxious for discharge as it was her daughter's birthday and their family was planning a birthday party. She was deemed medically stable for discharge after discussion with ID. She was provided prescriptions for a course of Tamiflu and Omnicef in addition to all other newly prescribed medications. PO intake was encouraged and she was instructed to follow up with her PCP for repeat labs. She was encouraged to discuss further evaluation of her anemia and suspected CKD with her PCP.       Condition on Discharge:  Stable    Vital Signs  Vitals:    02/09/18 0733   BP:    Pulse:    Resp:    Temp:    SpO2: 94%       Physical Exam:  General:    Awake, alert, in no acute distress   Heart:      Normal S1 and S2. Regular rate and rhythm. No significant murmur, rubs or gallops appreciated.   Lungs:     Respirations regular, even and unlabored. Lungs clear to auscultation B/L. No wheezes, rales or rhonchi.   Abdomen:   Soft and nontender. No guarding, rebound tenderness or  organomegaly noted. Bowel sounds present x 4.   Extremities:  B/L UE and LE contractures. Limited movement of UE's. No movement or sensation in B/L LE's.      Discharge Disposition:    home      Discharge Medications:   Awais Dorman   Home Medication Instructions ARMAND:[id number]    Printed on:02/09/18 1248   Medication Information                      cefdinir (OMNICEF) 300 MG capsule  Take 1 capsule by mouth Every 12 (Twelve) Hours for 7 doses. Indications: Urinary Tract Infection             ferrous sulfate 325 (65 FE) MG tablet  Take 1 tablet by mouth 2 (Two) Times a Day With Meals.             folic acid (FOLVITE) 1 MG tablet  Take 1 tablet by mouth Daily.             multivitamin (DAILY SHERON) tablet tablet  Take 1 tablet by mouth Daily.             oseltamivir (TAMIFLU) 6 MG/ML suspension  Take 5 mL by mouth Every 12 (Twelve) Hours for 5 doses. Indications: Flu             vitamin B-12 (VITAMIN B-12) 1000 MCG tablet  Take 1 tablet by mouth Daily.                   Discharge Diet:        Dietary Orders            Start     Ordered    02/08/18 1800  Dietary Nutrition Supplements Ensure Clear  Daily With Breakfast, Lunch & Dinner     Comments:  Please stock a couple in unit fridge for between meals as well as with meals   Question:  Select Supplement  Answer:  Ensure Clear    02/08/18 1545    02/06/18 1801  Diet Regular  Diet Effective Now     Question:  Diet Texture / Consistency  Answer:  Regular    02/06/18 1801          Activity at Discharge:  bedrest    Follow-up Appointments:  Additional Instructions for the Follow-ups that You Need to Schedule     Discharge Follow-up with PCP    As directed    Follow Up Details:  Follow up with Dr. Zara Li's office in 1 week. Recommend repeat CBC and BMP in 1 week.                 Follow-up Information     Follow up with No Known Provider .    Why:  Follow up with Dr. Zara Li's office in 1 week. Recommend repeat CBC and BMP in 1 week.    Contact information:    [facility] [id number]                Test Results Pending at Discharge:  None     Josafat Campbell DO  02/09/18  12:48 PM      Time: Greater than 30 minutes  spent on this discharge.

## 2018-02-09 NOTE — PLAN OF CARE
Problem: Patient Care Overview (Adult)  Goal: Adult Individualization and Mutuality  Outcome: Ongoing (interventions implemented as appropriate)    Goal: Discharge Needs Assessment  Outcome: Ongoing (interventions implemented as appropriate)      Problem: Sepsis (Adult)  Goal: Signs and Symptoms of Listed Potential Problems Will be Absent or Manageable (Sepsis)  Outcome: Ongoing (interventions implemented as appropriate)      Problem: Pressure Ulcer (Adult)  Goal: Signs and Symptoms of Listed Potential Problems Will be Absent or Manageable (Pressure Ulcer)  Outcome: Ongoing (interventions implemented as appropriate)      Problem: Pressure Ulcer Risk (Vega Scale) (Adult,Obstetrics,Pediatric)  Goal: Identify Related Risk Factors and Signs and Symptoms  Outcome: Ongoing (interventions implemented as appropriate)    Goal: Skin Integrity  Outcome: Ongoing (interventions implemented as appropriate)

## 2018-02-11 LAB
BACTERIA SPEC AEROBE CULT: NORMAL

## 2018-02-12 ENCOUNTER — TELEPHONE (OUTPATIENT)
Dept: MEDSURG UNIT | Facility: HOSPITAL | Age: 28
End: 2018-02-12

## 2018-06-17 ENCOUNTER — HOSPITAL ENCOUNTER (INPATIENT)
Facility: HOSPITAL | Age: 28
LOS: 5 days | Discharge: HOME HEALTH | End: 2018-06-22
Attending: FAMILY MEDICINE | Admitting: INTERNAL MEDICINE

## 2018-06-17 ENCOUNTER — APPOINTMENT (OUTPATIENT)
Dept: CT IMAGING | Facility: HOSPITAL | Age: 28
End: 2018-06-17

## 2018-06-17 DIAGNOSIS — N10 PYELONEPHRITIS, ACUTE: Primary | ICD-10-CM

## 2018-06-17 LAB
ALBUMIN SERPL-MCNC: 3.5 G/DL (ref 3.5–5)
ALBUMIN/GLOB SERPL: 0.8 G/DL (ref 1.5–2.5)
ALP SERPL-CCNC: 83 U/L (ref 35–104)
ALT SERPL W P-5'-P-CCNC: 3 U/L (ref 10–36)
ANION GAP SERPL CALCULATED.3IONS-SCNC: 8.1 MMOL/L (ref 3.6–11.2)
AST SERPL-CCNC: 10 U/L (ref 10–30)
B-HCG UR QL: NEGATIVE
BACTERIA UR QL AUTO: ABNORMAL /HPF
BASOPHILS # BLD AUTO: 0.04 10*3/MM3 (ref 0–0.3)
BASOPHILS NFR BLD AUTO: 0.2 % (ref 0–2)
BILIRUB SERPL-MCNC: 0.9 MG/DL (ref 0.2–1.8)
BILIRUB UR QL STRIP: NEGATIVE
BUN BLD-MCNC: 20 MG/DL (ref 7–21)
BUN/CREAT SERPL: 10.5 (ref 7–25)
CALCIUM SPEC-SCNC: 8.8 MG/DL (ref 7.7–10)
CHLORIDE SERPL-SCNC: 108 MMOL/L (ref 99–112)
CLARITY UR: ABNORMAL
CO2 SERPL-SCNC: 18.9 MMOL/L (ref 24.3–31.9)
COLOR UR: YELLOW
CREAT BLD-MCNC: 1.9 MG/DL (ref 0.43–1.29)
D-LACTATE SERPL-SCNC: 0.6 MMOL/L (ref 0.5–2)
DEPRECATED RDW RBC AUTO: 47.1 FL (ref 37–54)
EOSINOPHIL # BLD AUTO: 0.02 10*3/MM3 (ref 0–0.7)
EOSINOPHIL NFR BLD AUTO: 0.1 % (ref 0–5)
ERYTHROCYTE [DISTWIDTH] IN BLOOD BY AUTOMATED COUNT: 14.5 % (ref 11.5–14.5)
GFR SERPL CREATININE-BSD FRML MDRD: 32 ML/MIN/1.73
GLOBULIN UR ELPH-MCNC: 4.3 GM/DL
GLUCOSE BLD-MCNC: 102 MG/DL (ref 70–110)
GLUCOSE UR STRIP-MCNC: NEGATIVE MG/DL
HCT VFR BLD AUTO: 31.4 % (ref 37–47)
HGB BLD-MCNC: 9.8 G/DL (ref 12–16)
HGB UR QL STRIP.AUTO: ABNORMAL
HYALINE CASTS UR QL AUTO: ABNORMAL /LPF
IMM GRANULOCYTES # BLD: 0.06 10*3/MM3 (ref 0–0.03)
IMM GRANULOCYTES NFR BLD: 0.4 % (ref 0–0.5)
KETONES UR QL STRIP: ABNORMAL
LEUKOCYTE ESTERASE UR QL STRIP.AUTO: ABNORMAL
LYMPHOCYTES # BLD AUTO: 1.52 10*3/MM3 (ref 1–3)
LYMPHOCYTES NFR BLD AUTO: 9.1 % (ref 21–51)
MCH RBC QN AUTO: 29.3 PG (ref 27–33)
MCHC RBC AUTO-ENTMCNC: 31.2 G/DL (ref 33–37)
MCV RBC AUTO: 94 FL (ref 80–94)
MONOCYTES # BLD AUTO: 2.41 10*3/MM3 (ref 0.1–0.9)
MONOCYTES NFR BLD AUTO: 14.4 % (ref 0–10)
NEUTROPHILS # BLD AUTO: 12.65 10*3/MM3 (ref 1.4–6.5)
NEUTROPHILS NFR BLD AUTO: 75.8 % (ref 30–70)
NITRITE UR QL STRIP: POSITIVE
OSMOLALITY SERPL CALC.SUM OF ELEC: 272.9 MOSM/KG (ref 273–305)
PH UR STRIP.AUTO: 7 [PH] (ref 5–8)
PLATELET # BLD AUTO: 422 10*3/MM3 (ref 130–400)
PMV BLD AUTO: 9.2 FL (ref 6–10)
POTASSIUM BLD-SCNC: 3.7 MMOL/L (ref 3.5–5.3)
PROT SERPL-MCNC: 7.8 G/DL (ref 6–8)
PROT UR QL STRIP: ABNORMAL
RBC # BLD AUTO: 3.34 10*6/MM3 (ref 4.2–5.4)
RBC # UR: ABNORMAL /HPF
REF LAB TEST METHOD: ABNORMAL
SODIUM BLD-SCNC: 135 MMOL/L (ref 135–153)
SP GR UR STRIP: 1.01 (ref 1–1.03)
SQUAMOUS #/AREA URNS HPF: ABNORMAL /HPF
UROBILINOGEN UR QL STRIP: ABNORMAL
WBC NRBC COR # BLD: 16.7 10*3/MM3 (ref 4.5–12.5)
WBC UR QL AUTO: ABNORMAL /HPF

## 2018-06-17 PROCEDURE — 99285 EMERGENCY DEPT VISIT HI MDM: CPT

## 2018-06-17 PROCEDURE — 80053 COMPREHEN METABOLIC PANEL: CPT | Performed by: PHYSICIAN ASSISTANT

## 2018-06-17 PROCEDURE — 74176 CT ABD & PELVIS W/O CONTRAST: CPT | Performed by: RADIOLOGY

## 2018-06-17 PROCEDURE — 36415 COLL VENOUS BLD VENIPUNCTURE: CPT

## 2018-06-17 PROCEDURE — 25010000002 HEPARIN (PORCINE) PER 1000 UNITS: Performed by: PHYSICIAN ASSISTANT

## 2018-06-17 PROCEDURE — 81001 URINALYSIS AUTO W/SCOPE: CPT | Performed by: PHYSICIAN ASSISTANT

## 2018-06-17 PROCEDURE — 93005 ELECTROCARDIOGRAM TRACING: CPT | Performed by: PHYSICIAN ASSISTANT

## 2018-06-17 PROCEDURE — 25010000002 PIPERACILLIN-TAZOBACTAM: Performed by: PHYSICIAN ASSISTANT

## 2018-06-17 PROCEDURE — 87186 SC STD MICRODIL/AGAR DIL: CPT | Performed by: PHYSICIAN ASSISTANT

## 2018-06-17 PROCEDURE — 83605 ASSAY OF LACTIC ACID: CPT | Performed by: PHYSICIAN ASSISTANT

## 2018-06-17 PROCEDURE — 25010000002 ONDANSETRON PER 1 MG: Performed by: NURSE PRACTITIONER

## 2018-06-17 PROCEDURE — 81025 URINE PREGNANCY TEST: CPT | Performed by: PHYSICIAN ASSISTANT

## 2018-06-17 PROCEDURE — 87040 BLOOD CULTURE FOR BACTERIA: CPT | Performed by: PHYSICIAN ASSISTANT

## 2018-06-17 PROCEDURE — 99223 1ST HOSP IP/OBS HIGH 75: CPT | Performed by: INTERNAL MEDICINE

## 2018-06-17 PROCEDURE — 87077 CULTURE AEROBIC IDENTIFY: CPT | Performed by: PHYSICIAN ASSISTANT

## 2018-06-17 PROCEDURE — 87086 URINE CULTURE/COLONY COUNT: CPT | Performed by: PHYSICIAN ASSISTANT

## 2018-06-17 PROCEDURE — 85025 COMPLETE CBC W/AUTO DIFF WBC: CPT | Performed by: PHYSICIAN ASSISTANT

## 2018-06-17 PROCEDURE — 74176 CT ABD & PELVIS W/O CONTRAST: CPT

## 2018-06-17 PROCEDURE — 94799 UNLISTED PULMONARY SVC/PX: CPT

## 2018-06-17 PROCEDURE — 25010000002 CEFEPIME: Performed by: PHYSICIAN ASSISTANT

## 2018-06-17 RX ORDER — SODIUM CHLORIDE 0.9 % (FLUSH) 0.9 %
1-10 SYRINGE (ML) INJECTION AS NEEDED
Status: DISCONTINUED | OUTPATIENT
Start: 2018-06-17 | End: 2018-06-22 | Stop reason: HOSPADM

## 2018-06-17 RX ORDER — L.ACID,PARA/B.BIFIDUM/S.THERM 8B CELL
1 CAPSULE ORAL DAILY
Status: DISCONTINUED | OUTPATIENT
Start: 2018-06-17 | End: 2018-06-22 | Stop reason: HOSPADM

## 2018-06-17 RX ORDER — ACETAMINOPHEN 500 MG
1000 TABLET ORAL ONCE
Status: COMPLETED | OUTPATIENT
Start: 2018-06-17 | End: 2018-06-17

## 2018-06-17 RX ORDER — ACETAMINOPHEN 325 MG/1
650 TABLET ORAL ONCE
Status: COMPLETED | OUTPATIENT
Start: 2018-06-17 | End: 2018-06-17

## 2018-06-17 RX ORDER — SODIUM CHLORIDE 0.9 % (FLUSH) 0.9 %
10 SYRINGE (ML) INJECTION AS NEEDED
Status: DISCONTINUED | OUTPATIENT
Start: 2018-06-17 | End: 2018-06-22 | Stop reason: HOSPADM

## 2018-06-17 RX ORDER — SODIUM CHLORIDE 9 MG/ML
100 INJECTION, SOLUTION INTRAVENOUS CONTINUOUS
Status: DISCONTINUED | OUTPATIENT
Start: 2018-06-17 | End: 2018-06-20

## 2018-06-17 RX ORDER — HEPARIN SODIUM 5000 [USP'U]/ML
5000 INJECTION, SOLUTION INTRAVENOUS; SUBCUTANEOUS EVERY 12 HOURS SCHEDULED
Status: DISCONTINUED | OUTPATIENT
Start: 2018-06-17 | End: 2018-06-22 | Stop reason: HOSPADM

## 2018-06-17 RX ORDER — ONDANSETRON 2 MG/ML
4 INJECTION INTRAMUSCULAR; INTRAVENOUS EVERY 6 HOURS PRN
Status: DISCONTINUED | OUTPATIENT
Start: 2018-06-17 | End: 2018-06-19 | Stop reason: SDUPTHER

## 2018-06-17 RX ADMIN — SODIUM CHLORIDE 1000 ML: 9 INJECTION, SOLUTION INTRAVENOUS at 06:56

## 2018-06-17 RX ADMIN — SODIUM CHLORIDE 100 ML/HR: 9 INJECTION, SOLUTION INTRAVENOUS at 20:01

## 2018-06-17 RX ADMIN — TAZOBACTAM SODIUM AND PIPERACILLIN SODIUM 4.5 G: .5; 4 INJECTION, POWDER, LYOPHILIZED, FOR SOLUTION INTRAVENOUS at 06:56

## 2018-06-17 RX ADMIN — SODIUM CHLORIDE 100 ML/HR: 9 INJECTION, SOLUTION INTRAVENOUS at 09:59

## 2018-06-17 RX ADMIN — HEPARIN SODIUM 5000 UNITS: 5000 INJECTION, SOLUTION INTRAVENOUS; SUBCUTANEOUS at 09:59

## 2018-06-17 RX ADMIN — ONDANSETRON 4 MG: 2 INJECTION, SOLUTION INTRAMUSCULAR; INTRAVENOUS at 18:09

## 2018-06-17 RX ADMIN — Medication 1 CAPSULE: at 20:01

## 2018-06-17 RX ADMIN — CEFEPIME 2 G: 2 INJECTION, POWDER, FOR SOLUTION INTRAVENOUS at 17:37

## 2018-06-17 RX ADMIN — ACETAMINOPHEN 1000 MG: 500 TABLET ORAL at 06:55

## 2018-06-17 RX ADMIN — SODIUM CHLORIDE 1000 ML: 9 INJECTION, SOLUTION INTRAVENOUS at 04:52

## 2018-06-17 RX ADMIN — ACETAMINOPHEN 650 MG: 325 TABLET, FILM COATED ORAL at 18:38

## 2018-06-17 RX ADMIN — CEFEPIME 2 G: 2 INJECTION, POWDER, FOR SOLUTION INTRAVENOUS at 11:27

## 2018-06-17 NOTE — ED NOTES
Patient in bed resting no distress noted. A&Ox4 vital signs stable. No request at this time. Patient updated regarding plan of care, will continue to follow plan of care. Awaiting further orders, will continue to monitor.     David Senior RN  06/17/18 0799

## 2018-06-17 NOTE — ED NOTES
Patient has multiple pressure ulcers noted to coccyx and buttocks.     Chase Henriquez RN  06/17/18 0607

## 2018-06-17 NOTE — ED NOTES
Patient presents to the ER tonight via [facility] due to a fever for the past 2 days. Patient states that she is a quadriplegic, states that she has a history of recurrent urinary tract infections, states that she keeps them the majority of the time, but states that her dysuria has worsened and she has had a noted foul odor to urine. Patient states that she has not had any tylenol or motrin tonight.     Chase Henriquez RN  06/17/18 6712
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
## 2018-06-18 LAB
ANION GAP SERPL CALCULATED.3IONS-SCNC: 7.9 MMOL/L (ref 3.6–11.2)
BASOPHILS # BLD AUTO: 0.06 10*3/MM3 (ref 0–0.3)
BASOPHILS NFR BLD AUTO: 0.7 % (ref 0–2)
BUN BLD-MCNC: 19 MG/DL (ref 7–21)
BUN/CREAT SERPL: 11 (ref 7–25)
CALCIUM SPEC-SCNC: 8 MG/DL (ref 7.7–10)
CHLORIDE SERPL-SCNC: 112 MMOL/L (ref 99–112)
CO2 SERPL-SCNC: 16.1 MMOL/L (ref 24.3–31.9)
CREAT BLD-MCNC: 1.73 MG/DL (ref 0.43–1.29)
CRP SERPL-MCNC: 24.12 MG/DL (ref 0–0.99)
DEPRECATED RDW RBC AUTO: 51.2 FL (ref 37–54)
EOSINOPHIL # BLD AUTO: 0.04 10*3/MM3 (ref 0–0.7)
EOSINOPHIL NFR BLD AUTO: 0.5 % (ref 0–5)
ERYTHROCYTE [DISTWIDTH] IN BLOOD BY AUTOMATED COUNT: 14.8 % (ref 11.5–14.5)
FERRITIN SERPL-MCNC: 220 NG/ML (ref 10–290.3)
FOLATE SERPL-MCNC: 3.78 NG/ML (ref 5.4–20)
GFR SERPL CREATININE-BSD FRML MDRD: 36 ML/MIN/1.73
GLUCOSE BLD-MCNC: 90 MG/DL (ref 70–110)
HCT VFR BLD AUTO: 26.1 % (ref 37–47)
HGB BLD-MCNC: 7.8 G/DL (ref 12–16)
IMM GRANULOCYTES # BLD: 0.04 10*3/MM3 (ref 0–0.03)
IMM GRANULOCYTES NFR BLD: 0.5 % (ref 0–0.5)
IRON 24H UR-MRATE: 8 MCG/DL (ref 49–151)
IRON SATN MFR SERPL: 4 % (ref 15–50)
LYMPHOCYTES # BLD AUTO: 0.83 10*3/MM3 (ref 1–3)
LYMPHOCYTES NFR BLD AUTO: 10.1 % (ref 21–51)
MCH RBC QN AUTO: 28 PG (ref 27–33)
MCHC RBC AUTO-ENTMCNC: 29.9 G/DL (ref 33–37)
MCV RBC AUTO: 93.5 FL (ref 80–94)
MONOCYTES # BLD AUTO: 0.7 10*3/MM3 (ref 0.1–0.9)
MONOCYTES NFR BLD AUTO: 8.5 % (ref 0–10)
NEUTROPHILS # BLD AUTO: 6.54 10*3/MM3 (ref 1.4–6.5)
NEUTROPHILS NFR BLD AUTO: 79.7 % (ref 30–70)
NRBC BLD MANUAL-RTO: 0 /100 WBC (ref 0–0)
OSMOLALITY SERPL CALC.SUM OF ELEC: 273.7 MOSM/KG (ref 273–305)
PLATELET # BLD AUTO: 301 10*3/MM3 (ref 130–400)
PMV BLD AUTO: 9.5 FL (ref 6–10)
POTASSIUM BLD-SCNC: 3.9 MMOL/L (ref 3.5–5.3)
RBC # BLD AUTO: 2.79 10*6/MM3 (ref 4.2–5.4)
SODIUM BLD-SCNC: 136 MMOL/L (ref 135–153)
TIBC SERPL-MCNC: 180 MCG/DL (ref 241–421)
VIT B12 BLD-MCNC: 237 PG/ML (ref 211–911)
WBC NRBC COR # BLD: 8.21 10*3/MM3 (ref 4.5–12.5)

## 2018-06-18 PROCEDURE — 25010000002 CEFEPIME: Performed by: PHYSICIAN ASSISTANT

## 2018-06-18 PROCEDURE — 82728 ASSAY OF FERRITIN: CPT | Performed by: PHYSICIAN ASSISTANT

## 2018-06-18 PROCEDURE — 99251 PR INITL INPATIENT CONSULT NEW/ESTAB PT 20 MIN: CPT | Performed by: SURGERY

## 2018-06-18 PROCEDURE — 85025 COMPLETE CBC W/AUTO DIFF WBC: CPT | Performed by: PHYSICIAN ASSISTANT

## 2018-06-18 PROCEDURE — 86140 C-REACTIVE PROTEIN: CPT | Performed by: PHYSICIAN ASSISTANT

## 2018-06-18 PROCEDURE — 99232 SBSQ HOSP IP/OBS MODERATE 35: CPT | Performed by: HOSPITALIST

## 2018-06-18 PROCEDURE — 83550 IRON BINDING TEST: CPT | Performed by: PHYSICIAN ASSISTANT

## 2018-06-18 PROCEDURE — 80048 BASIC METABOLIC PNL TOTAL CA: CPT | Performed by: PHYSICIAN ASSISTANT

## 2018-06-18 PROCEDURE — 83540 ASSAY OF IRON: CPT | Performed by: PHYSICIAN ASSISTANT

## 2018-06-18 PROCEDURE — 82607 VITAMIN B-12: CPT | Performed by: PHYSICIAN ASSISTANT

## 2018-06-18 PROCEDURE — 82746 ASSAY OF FOLIC ACID SERUM: CPT | Performed by: PHYSICIAN ASSISTANT

## 2018-06-18 PROCEDURE — 94799 UNLISTED PULMONARY SVC/PX: CPT

## 2018-06-18 PROCEDURE — 25010000002 HEPARIN (PORCINE) PER 1000 UNITS: Performed by: PHYSICIAN ASSISTANT

## 2018-06-18 RX ORDER — SENNA AND DOCUSATE SODIUM 50; 8.6 MG/1; MG/1
2 TABLET, FILM COATED ORAL NIGHTLY
Status: DISCONTINUED | OUTPATIENT
Start: 2018-06-18 | End: 2018-06-22 | Stop reason: HOSPADM

## 2018-06-18 RX ORDER — SODIUM HYPOCHLORITE 1.25 MG/ML
SOLUTION TOPICAL 2 TIMES DAILY
Status: DISCONTINUED | OUTPATIENT
Start: 2018-06-18 | End: 2018-06-19

## 2018-06-18 RX ORDER — MAGNESIUM HYDROXIDE 1200 MG/15ML
1000 LIQUID ORAL AS NEEDED
Status: DISCONTINUED | OUTPATIENT
Start: 2018-06-18 | End: 2018-06-22 | Stop reason: HOSPADM

## 2018-06-18 RX ADMIN — SODIUM CHLORIDE 100 ML/HR: 9 INJECTION, SOLUTION INTRAVENOUS at 12:08

## 2018-06-18 RX ADMIN — Medication 1 CAPSULE: at 09:46

## 2018-06-18 RX ADMIN — SODIUM HYPOCHLORITE: 1.25 SOLUTION TOPICAL at 19:18

## 2018-06-18 RX ADMIN — HEPARIN SODIUM 5000 UNITS: 5000 INJECTION, SOLUTION INTRAVENOUS; SUBCUTANEOUS at 09:46

## 2018-06-18 RX ADMIN — SODIUM HYPOCHLORITE: 1.25 SOLUTION TOPICAL at 12:08

## 2018-06-18 RX ADMIN — DOCUSATE SODIUM AND SENNOSIDES 2 TABLET: 8.6; 5 TABLET, FILM COATED ORAL at 19:17

## 2018-06-18 RX ADMIN — CEFEPIME 2 G: 2 INJECTION, POWDER, FOR SOLUTION INTRAVENOUS at 17:23

## 2018-06-18 RX ADMIN — COLLAGENASE SANTYL: 250 OINTMENT TOPICAL at 19:17

## 2018-06-18 RX ADMIN — CEFEPIME 2 G: 2 INJECTION, POWDER, FOR SOLUTION INTRAVENOUS at 05:01

## 2018-06-18 RX ADMIN — HEPARIN SODIUM 5000 UNITS: 5000 INJECTION, SOLUTION INTRAVENOUS; SUBCUTANEOUS at 19:17

## 2018-06-18 RX ADMIN — SODIUM CHLORIDE 100 ML/HR: 9 INJECTION, SOLUTION INTRAVENOUS at 19:17
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
## 2018-06-18 NOTE — NURSING NOTE
Stage 3 x 1 and unstageable pressure injuries x 2 were present on admission to unit.  Treatment ordered  Spoke with Yvette Cherry related to need for urine and bowel diversion away from wound  Recommended surgical consult.                06/18/18 1126   Wound 06/17/18 1000 lower gluteal pressure injury   Date first assessed/Time first assessed: 06/17/18 1000   Present On Admission : yes;picture taken  Orientation: lower  Location: (c) gluteal  Type: pressure injury  Stage, Pressure Injury: unstageable   Dressing Appearance moist drainage   Base yellow;moist;slough   Yellow (%), Wound Tissue Color 90   Periwound macerated   Edges open   Wound Length (cm) 6 cm   Wound Width (cm) 3 cm   Drainage Characteristics/Odor purulent   Drainage Amount moderate   Wound 06/17/18 lower coccyx pressure injury   Date first assessed: 06/17/18   Present On Admission : yes;picture taken  Orientation: lower  Location: coccyx  Type: pressure injury  Stage, Pressure Injury: unstageable   Dressing Appearance moist drainage   Base slough;yellow;pink   Yellow (%), Wound Tissue Color 75   Periwound redness   Wound Length (cm) 4.5 cm   Wound Width (cm) 10 cm   Drainage Characteristics/Odor purulent   Drainage Amount moderate   Wound 06/18/18 0930 Right lower gluteal pressure injury   Date first assessed/Time first assessed: 06/18/18 0930   Present On Admission : yes;picture taken  Side: Right  Orientation: lower  Location: gluteal  Type: pressure injury  Stage, Pressure Injury: Stage 3   Dressing Appearance moist drainage   Base pink;moist   Periwound macerated   Edges open   Wound Length (cm) 4 cm   Wound Width (cm) 2 cm   Wound Depth (cm) 0.7 cm   Drainage Characteristics/Odor malodorous;serous   Drainage Amount small

## 2018-06-18 NOTE — PROGRESS NOTES
"  I have personally seen and examined the patient today and discussed overnight interval progress and pertinent issues with nursing staff.    Subjective       Clinically stable with continued low-grade fever. Resolved leukocytosis elevated CRP at 24.12. Might need to de-escalate coverage if persistent fever or resistance.  CRP in a.m.       History taken from: patient chart RN      Objective       Vital Signs    BP 93/46 (BP Location: Left arm)   Pulse 102   Temp 100.2 °F (37.9 °C) (Oral) Comment: reported all vitals to RN Sarah  Resp 16   Ht 170.2 cm (67\")   Wt 44.7 kg (98 lb 9.6 oz)   SpO2 97%   BMI 15.44 kg/m²     Temp:  [98.2 °F (36.8 °C)-100.7 °F (38.2 °C)] 100.2 °F (37.9 °C)      Intake/Output Summary (Last 24 hours) at 06/18/18 1246  Last data filed at 06/18/18 0805   Gross per 24 hour   Intake             1310 ml   Output                0 ml   Net             1310 ml     Intake & Output (last 3 days)       06/15 0701 - 06/16 0700 06/16 0701 - 06/17 0700 06/17 0701 - 06/18 0700 06/18 0701 - 06/19 0700    P.O.   840 100    I.V. (mL/kg)   850 (19)     IV Piggyback  1000 1100     Total Intake(mL/kg)  1000 (22) 2790 (62.4) 100 (2.2)    Net   +1000 +2790 +100            Unmeasured Urine Occurrence   6 x 1 x    Unmeasured Stool Occurrence   1 x 1 x          Physical Exam:                 General Appearance:    Alert, cooperative, in no acute distress   Head:    Normocephalic, without obvious abnormality, atraumatic   Eyes:            Lids and lashes normal, conjunctivae and sclerae normal, no   icterus, no pallor, corneas clear, PERRLA   Ears:    Ears appear intact with no abnormalities noted   Throat:   No oral lesions, no thrush, oral mucosa moist   Neck:   No adenopathy, supple, trachea midline, no thyromegaly, no   carotid bruit, no JVD   Back:     No tenderness to percussion or palpation, range of motion   normal   Lungs:     Clear to auscultation,respirations regular, even and unlabored. No wheezing, " no ronchi and no crackles.    Heart:    Regular rhythm and normal rate, normal S1 and S2, no            murmur, no gallop, no rub, no click   Chest Wall:    No abnormalities observed   Abdomen:     Normal bowel sounds, no masses, no organomegaly, soft        non-tender, non-distended, no guarding, no rebound                tenderness   Rectal:     Deferred   Extremities:   Quadraplegic, hands contracted   Pulses:   Pulses palpable and equal bilaterally   Skin:   Chronic ongoing coccyx wound    Lymph nodes:   No palpable adenopathy   Neurologic:   Awake, alert and oriented x 3. Following commands.         Results:      Results from last 7 days  Lab Units 06/18/18  0128 06/17/18  0454   WBC 10*3/mm3 8.21 16.70*     Lab Results   Component Value Date    NEUTROABS 6.54 (H) 06/18/2018         Results from last 7 days  Lab Units 06/18/18  0128   CREATININE mg/dL 1.73*         Results from last 7 days  Lab Units 06/18/18  0128   CRP mg/dL 24.12*     Results Review:    I have personally reviewed laboratory data, culture results, radiology studies and antimicrobial therapy.    Hospital Medications (active)       Dose Frequency Start End    acetaminophen (TYLENOL) tablet 650 mg 650 mg Once 6/17/2018 6/17/2018    Sig - Route: Take 2 tablets by mouth 1 (One) Time. - Oral    cefepime (MAXIPIME) 2 g/100 mL 0.9% NS (mbp) 2 g Every 12 Hours 6/17/2018 6/24/2018    Sig - Route: Infuse 100 mL into a venous catheter Every 12 (Twelve) Hours. - Intravenous    Cosign for Ordering: Accepted by Toi Hodgson MD on 6/18/2018  8:47 AM    heparin (porcine) 5000 UNIT/ML injection 5,000 Units 5,000 Units Every 12 Hours Scheduled 6/17/2018     Sig - Route: Inject 1 mL under the skin Every 12 (Twelve) Hours. - Subcutaneous    Cosign for Ordering: Accepted by Josafat Campbell DO on 6/17/2018 11:28 AM    lactobacillus acidophilus (RISAQUAD) capsule 1 capsule 1 capsule Daily 6/17/2018     Sig - Route: Take 1 capsule by mouth Daily. - Oral  "   ondansetron (ZOFRAN) injection 4 mg 4 mg Every 6 Hours PRN 6/17/2018     Sig - Route: Infuse 2 mL into a venous catheter Every 6 (Six) Hours As Needed for Nausea or Vomiting. - Intravenous    sodium chloride 0.9 % flush 1-10 mL 1-10 mL As Needed 6/17/2018     Sig - Route: Infuse 1-10 mL into a venous catheter As Needed for Line Care. - Intravenous    Cosign for Ordering: Accepted by Josafat Campbell DO on 6/17/2018 11:28 AM    sodium chloride 0.9 % flush 10 mL 10 mL As Needed 6/17/2018     Sig - Route: Infuse 10 mL into a venous catheter As Needed for Line Care. - Intravenous    Cosign for Ordering: Accepted by Dianne Cazares DO on 6/17/2018  9:35 PM    Linked Group 1:  \"And\" Linked Group Details        sodium chloride 0.9 % infusion 100 mL/hr Continuous 6/17/2018     Sig - Route: Infuse 100 mL/hr into a venous catheter Continuous. - Intravenous    Cosign for Ordering: Accepted by Josafat Campbell DO on 6/17/2018 11:28 AM    sodium hypochlorite (DAKIN'S 1/4 STRENGTH) 0.125 % topical solution 0.125% solution  2 Times Daily 6/18/2018     Sig - Route: Apply  topically 2 (Two) Times a Day. - Topical            Cultures:    Blood Culture   Date Value Ref Range Status   06/17/2018 No growth at 24 hours  Preliminary   06/17/2018 No growth at 24 hours  Preliminary         ASSESSMENT/PLAN           Assessment/Plan     ASSESSMENT:       1.  Sepsis  2.  Pyelonephritis     PLAN:     Clinically stable with continued low-grade fever. Resolved leukocytosis elevated CRP at 24.12. Might need to escalate coverage if persistent fever or resistance.  CRP in a.m.     In the setting of pyelonephritis and based on previous culture results would recommend cefepime 2g IV q 12 hours.  We'll follow culture results and adjust antibiotic therapy appropriately.  Patient reports an allergy to Rocephin with hives but tolerated cefepime and Omnicef recently with no reaction.  Would monitor closely for any signs of " rash.    Patient's findings and recommendations were discussed with patient and nursing staff    Code Status: Full Code    Milagro Vasquez PA-C  06/18/18  12:46 PM      Physician Attestation:    I have personally seen and examined the patient. I reviewed the patient's data including history of present illness, review of systems, physical examination, assessment and treatment plan and agree with findings above. The assessment and plan are my own.  I have reviewed and edited the note above after discussing the findings with the midlevel.    Toi Hodgson MD  Infectious Diseases  06/19/18  9:36 AM

## 2018-06-18 NOTE — PROGRESS NOTES
Discharge Planning Assessment   [facility]     Patient Name: Awais Dorman  MRN: [id number]  Today's Date: 6/18/2018    Admit Date: 6/17/2018          Discharge Needs Assessment     Row Name 06/18/18 1127       Living Environment    Lives With spouse   SS spoke with pt on this date. Pt lives at home with  and plans to return home at discharge.     Current Living Arrangements home/apartment/condo    Primary Care Provided by spouse/significant other    Quality of Family Relationships helpful;involved;supportive    Able to Return to Prior Arrangements yes       Resource/Environmental Concerns    Resource/Environmental Concerns none    Transportation Concerns car, none       Transition Planning    Patient/Family Anticipates Transition to home with family    Patient/Family Anticipated Services at Transition none    Transportation Anticipated family or friend will provide       Discharge Needs Assessment    Equipment Currently Used at Home wheelchair, motorized;lift device;hospital bed    Equipment Needed After Discharge none            Discharge Plan     Row Name 06/18/18 1129       Plan    Plan Pt lives at home with spouse and plans to return home at discharge. Pt does not have home health at this time. Pt does not have a POA or living will. Pt is not interested in completing a living will at this time. SS will follow and assist as needed    Patient/Family in Agreement with Plan yes     Demographic Summary     Row Name 06/18/18 1127       General Information    Referral Source nursing    Reason for Consult discharge planning    Preferred Language English        Alayna David

## 2018-06-18 NOTE — PROGRESS NOTES
Nutrition Services    Patient Name:  Awais Dorman  YOB: 1990  MRN: [id number]  Admit Date:  6/17/2018    Pt w/  Multiple pressure injuries, BMI of 15.4, malnutrition severity assessment completed, see form.    Will add Ensure Enlive TID and add Fran BID to aid  Healing    RECOMMEND:  ADDING MVI daily to also aid in healing.    RD will follow, thank you      Electronically signed by:  Padma Estevez RD  06/18/18 2:30 PM

## 2018-06-18 NOTE — PROGRESS NOTES
Was contacted per Alejandra Crook, wound care RN. Patient found to have unstagable pressure wounds to bilateral glutes and coccyx. Wound care RN recommending surgical consult and nolasco catheter placement as patient is getting urine and fecal material in wounds. Nolasco cath ordered as well as general surgery consultation. Appreciate input/assistance. Continue wound care. Dr. Lobato to follow with patient today as attending. Please do not hesitate to call with any questions or concerns.     Naye Durham PA-C  Hospitalist Services   6/18/2018  4029

## 2018-06-18 NOTE — PROGRESS NOTES
Malnutrition Severity Assessment    Patient Name:  Awais Dorman  YOB: 1990  MRN: [id number]  Admit Date:  6/17/2018    Patient meets criteria for : Severe malnutrition    Comments:  MD, please review, attest and add severe chronic illness malnutrition to problem list if agree.  Thank you     Malnutrition Type: Chronic Illness Malnutrition     Malnutrition Type (last 8 hours)      Malnutrition Severity Assessment     Row Name 06/18/18 1416       Malnutrition Severity Assessment    Malnutrition Type Chronic Illness Malnutrition    Row Name 06/18/18 1416       Physical Signs of Malnutrition (Chronic)    Muscle Wasting Severe    Fat Loss None    Fluid Accumulation None    Secondary Physical Signs Present (comment)   unstageable pressure ulcers    Row Name 06/18/18 1416       Weight Status (Chronic)    BMI Severe (<16)   15.4    %IBW Mod <80%   72%    %UBW --   @ same    Weight Loss --   unsure per pt, loss of 4% in 4 months per hosp records    Row Name 06/18/18 1416       Energy Intake Status (Chronic)    Energy Intake --   pt states eats good    Row Name 06/18/18 1416       Criteria Met (Must meet criteria for severity in at least 2 of these categories: M Wasting, Fat Loss, Fluid, Secondary Signs, Wt. Status, Intake)    Patient meets criteria for  Severe malnutrition          Electronically signed by:  Padma Estevez RD  06/18/18 2:34 PM

## 2018-06-19 ENCOUNTER — APPOINTMENT (OUTPATIENT)
Dept: GENERAL RADIOLOGY | Facility: HOSPITAL | Age: 28
End: 2018-06-19

## 2018-06-19 LAB
ANION GAP SERPL CALCULATED.3IONS-SCNC: 9.1 MMOL/L (ref 3.6–11.2)
BASOPHILS # BLD AUTO: 0.03 10*3/MM3 (ref 0–0.3)
BASOPHILS NFR BLD AUTO: 0.7 % (ref 0–2)
BUN BLD-MCNC: 19 MG/DL (ref 7–21)
BUN/CREAT SERPL: 10.8 (ref 7–25)
CALCIUM SPEC-SCNC: 7.4 MG/DL (ref 7.7–10)
CHLORIDE SERPL-SCNC: 116 MMOL/L (ref 99–112)
CO2 SERPL-SCNC: 12.9 MMOL/L (ref 24.3–31.9)
CREAT BLD-MCNC: 1.76 MG/DL (ref 0.43–1.29)
CRP SERPL-MCNC: 15.72 MG/DL (ref 0–0.99)
DEPRECATED RDW RBC AUTO: 49.7 FL (ref 37–54)
EOSINOPHIL # BLD AUTO: 0.06 10*3/MM3 (ref 0–0.7)
EOSINOPHIL NFR BLD AUTO: 1.4 % (ref 0–5)
ERYTHROCYTE [DISTWIDTH] IN BLOOD BY AUTOMATED COUNT: 14.8 % (ref 11.5–14.5)
GFR SERPL CREATININE-BSD FRML MDRD: 35 ML/MIN/1.73
GLUCOSE BLD-MCNC: 74 MG/DL (ref 70–110)
GLUCOSE BLDC GLUCOMTR-MCNC: 68 MG/DL (ref 70–130)
HCT VFR BLD AUTO: 27.3 % (ref 37–47)
HGB BLD-MCNC: 7.9 G/DL (ref 12–16)
HYPOCHROMIA BLD QL: NORMAL
IMM GRANULOCYTES # BLD: 0.04 10*3/MM3 (ref 0–0.03)
IMM GRANULOCYTES NFR BLD: 1 % (ref 0–0.5)
LYMPHOCYTES # BLD AUTO: 0.67 10*3/MM3 (ref 1–3)
LYMPHOCYTES NFR BLD AUTO: 16.1 % (ref 21–51)
MCH RBC QN AUTO: 28.4 PG (ref 27–33)
MCHC RBC AUTO-ENTMCNC: 28.9 G/DL (ref 33–37)
MCV RBC AUTO: 98.2 FL (ref 80–94)
MONOCYTES # BLD AUTO: 0.43 10*3/MM3 (ref 0.1–0.9)
MONOCYTES NFR BLD AUTO: 10.3 % (ref 0–10)
NEUTROPHILS # BLD AUTO: 2.94 10*3/MM3 (ref 1.4–6.5)
NEUTROPHILS NFR BLD AUTO: 70.5 % (ref 30–70)
OSMOLALITY SERPL CALC.SUM OF ELEC: 276.6 MOSM/KG (ref 273–305)
PLAT MORPH BLD: NORMAL
PLATELET # BLD AUTO: 262 10*3/MM3 (ref 130–400)
PMV BLD AUTO: 9.1 FL (ref 6–10)
POTASSIUM BLD-SCNC: 4.1 MMOL/L (ref 3.5–5.3)
RBC # BLD AUTO: 2.78 10*6/MM3 (ref 4.2–5.4)
SODIUM BLD-SCNC: 138 MMOL/L (ref 135–153)
WBC NRBC COR # BLD: 4.17 10*3/MM3 (ref 4.5–12.5)

## 2018-06-19 PROCEDURE — 99232 SBSQ HOSP IP/OBS MODERATE 35: CPT | Performed by: HOSPITALIST

## 2018-06-19 PROCEDURE — 74018 RADEX ABDOMEN 1 VIEW: CPT

## 2018-06-19 PROCEDURE — 86140 C-REACTIVE PROTEIN: CPT | Performed by: PHYSICIAN ASSISTANT

## 2018-06-19 PROCEDURE — 82962 GLUCOSE BLOOD TEST: CPT

## 2018-06-19 PROCEDURE — 85007 BL SMEAR W/DIFF WBC COUNT: CPT | Performed by: PHYSICIAN ASSISTANT

## 2018-06-19 PROCEDURE — 25010000002 CEFEPIME: Performed by: PHYSICIAN ASSISTANT

## 2018-06-19 PROCEDURE — 25010000002 ONDANSETRON PER 1 MG: Performed by: HOSPITALIST

## 2018-06-19 PROCEDURE — 80048 BASIC METABOLIC PNL TOTAL CA: CPT | Performed by: PHYSICIAN ASSISTANT

## 2018-06-19 PROCEDURE — 85025 COMPLETE CBC W/AUTO DIFF WBC: CPT | Performed by: PHYSICIAN ASSISTANT

## 2018-06-19 PROCEDURE — 74018 RADEX ABDOMEN 1 VIEW: CPT | Performed by: RADIOLOGY

## 2018-06-19 PROCEDURE — 25010000002 HEPARIN (PORCINE) PER 1000 UNITS: Performed by: PHYSICIAN ASSISTANT

## 2018-06-19 PROCEDURE — 99231 SBSQ HOSP IP/OBS SF/LOW 25: CPT | Performed by: SURGERY

## 2018-06-19 PROCEDURE — 94799 UNLISTED PULMONARY SVC/PX: CPT

## 2018-06-19 RX ORDER — ONDANSETRON 2 MG/ML
4 INJECTION INTRAMUSCULAR; INTRAVENOUS EVERY 6 HOURS PRN
Status: DISCONTINUED | OUTPATIENT
Start: 2018-06-19 | End: 2018-06-22 | Stop reason: HOSPADM

## 2018-06-19 RX ADMIN — HEPARIN SODIUM 5000 UNITS: 5000 INJECTION, SOLUTION INTRAVENOUS; SUBCUTANEOUS at 19:45

## 2018-06-19 RX ADMIN — COLLAGENASE SANTYL: 250 OINTMENT TOPICAL at 11:45

## 2018-06-19 RX ADMIN — HEPARIN SODIUM 5000 UNITS: 5000 INJECTION, SOLUTION INTRAVENOUS; SUBCUTANEOUS at 11:45

## 2018-06-19 RX ADMIN — DOCUSATE SODIUM AND SENNOSIDES 2 TABLET: 8.6; 5 TABLET, FILM COATED ORAL at 19:45

## 2018-06-19 RX ADMIN — ONDANSETRON 4 MG: 2 INJECTION, SOLUTION INTRAMUSCULAR; INTRAVENOUS at 17:38

## 2018-06-19 RX ADMIN — SODIUM CHLORIDE 100 ML/HR: 9 INJECTION, SOLUTION INTRAVENOUS at 14:49

## 2018-06-19 RX ADMIN — Medication 1 CAPSULE: at 11:45

## 2018-06-19 RX ADMIN — SODIUM CHLORIDE 100 ML/HR: 9 INJECTION, SOLUTION INTRAVENOUS at 19:45

## 2018-06-19 RX ADMIN — CEFEPIME 2 G: 2 INJECTION, POWDER, FOR SOLUTION INTRAVENOUS at 17:39

## 2018-06-19 RX ADMIN — CEFEPIME 2 G: 2 INJECTION, POWDER, FOR SOLUTION INTRAVENOUS at 04:23

## 2018-06-19 NOTE — PROGRESS NOTES
Decubitus ulcers    The patient initially did not want debridement yesterday, then said it was ok last night. This morning again she does not want to. Continue santyl and I will see her prn.

## 2018-06-19 NOTE — NURSING NOTE
F/c leaking. Removed and new f/c reinserted. F/c hits resistance and will not insert completely. Pt states her urologist in [place] has difficulty inserting a scope. She states her spouse straight caths her at home, but she urinates a lot on herself in between the straight caths every 6 hours.

## 2018-06-19 NOTE — PROGRESS NOTES
"  I have personally seen and examined the patient today and discussed overnight interval progress and pertinent issues with nursing staff.    Subjective       Comfortable this morning with no complaints.  CRP showing significant improvement.  Urine culture some progress.  No fever overnight.  Patient refused debridement of coccyx wound.      History taken from: patient chart RN      Objective       Vital Signs    /62 (BP Location: Left arm, Patient Position: Lying)   Pulse 94   Temp 98.4 °F (36.9 °C) (Oral)   Resp 16   Ht 170.2 cm (67\")   Wt 44.7 kg (98 lb 9.6 oz)   SpO2 98%   BMI 15.44 kg/m²     Temp:  [97.9 °F (36.6 °C)-98.9 °F (37.2 °C)] 98.4 °F (36.9 °C)      Intake/Output Summary (Last 24 hours) at 06/19/18 1054  Last data filed at 06/19/18 0840   Gross per 24 hour   Intake             1420 ml   Output              600 ml   Net              820 ml     Intake & Output (last 3 days)       06/16 0701 - 06/17 0700 06/17 0701 - 06/18 0700 06/18 0701 - 06/19 0700 06/19 0701 - 06/20 0700    P.O.  840 460 210    I.V. (mL/kg)  850 (19) 850 (19)     IV Piggyback 1000 1100      Total Intake(mL/kg) 1000 (22) 2790 (62.4) 1310 (29.3) 210 (4.7)    Urine (mL/kg/hr)   600 (0.6)     Stool   0 (0)     Total Output     600      Net +1000 +2790 +710 +210            Unmeasured Urine Occurrence  6 x 1 x     Unmeasured Stool Occurrence  1 x 1 x           Physical Exam:                 General Appearance:    Alert, cooperative, in no acute distress   Head:    Normocephalic, without obvious abnormality, atraumatic   Eyes:            Lids and lashes normal, conjunctivae and sclerae normal, no   icterus, no pallor, corneas clear, PERRLA   Ears:    Ears appear intact with no abnormalities noted   Throat:   No oral lesions, no thrush, oral mucosa moist   Neck:   No adenopathy, supple, trachea midline, no thyromegaly, no   carotid bruit, no JVD   Back:     No tenderness to percussion or palpation, range of motion   normal "   Lungs:     Clear to auscultation,respirations regular, even and unlabored. No wheezing, no ronchi and no crackles.    Heart:    Regular rhythm and normal rate, normal S1 and S2, no            murmur, no gallop, no rub, no click   Chest Wall:    No abnormalities observed   Abdomen:     Normal bowel sounds, no masses, no organomegaly, soft        non-tender, non-distended, no guarding, no rebound                tenderness   Rectal:     Deferred   Extremities:   Quadraplegic, hands contracted   Pulses:   Pulses palpable and equal bilaterally   Skin:   Chronic ongoing coccyx wound    Lymph nodes:   No palpable adenopathy   Neurologic:   Awake, alert and oriented x 3. Following commands.         Results:      Results from last 7 days  Lab Units 06/19/18  0157 06/18/18  0128 06/17/18  0454   WBC 10*3/mm3 4.17* 8.21 16.70*     Lab Results   Component Value Date    NEUTROABS 2.94 06/19/2018         Results from last 7 days  Lab Units 06/19/18  0157   CREATININE mg/dL 1.76*         Results from last 7 days  Lab Units 06/19/18  0157 06/18/18  0128   CRP mg/dL 15.72* 24.12*     Results Review:    I have personally reviewed laboratory data, culture results, radiology studies and antimicrobial therapy.    Hospital Medications (active)       Dose Frequency Start End    acetaminophen (TYLENOL) tablet 650 mg 650 mg Once 6/17/2018 6/17/2018    Sig - Route: Take 2 tablets by mouth 1 (One) Time. - Oral    cefepime (MAXIPIME) 2 g/100 mL 0.9% NS (mbp) 2 g Every 12 Hours 6/17/2018 6/24/2018    Sig - Route: Infuse 100 mL into a venous catheter Every 12 (Twelve) Hours. - Intravenous    Cosign for Ordering: Accepted by Toi Hodgson MD on 6/18/2018  8:47 AM    heparin (porcine) 5000 UNIT/ML injection 5,000 Units 5,000 Units Every 12 Hours Scheduled 6/17/2018     Sig - Route: Inject 1 mL under the skin Every 12 (Twelve) Hours. - Subcutaneous    Cosign for Ordering: Accepted by Josafat Campbell DO on 6/17/2018 11:28 AM     "lactobacillus acidophilus (RISAQUAD) capsule 1 capsule 1 capsule Daily 6/17/2018     Sig - Route: Take 1 capsule by mouth Daily. - Oral    ondansetron (ZOFRAN) injection 4 mg 4 mg Every 6 Hours PRN 6/17/2018     Sig - Route: Infuse 2 mL into a venous catheter Every 6 (Six) Hours As Needed for Nausea or Vomiting. - Intravenous    sodium chloride 0.9 % flush 1-10 mL 1-10 mL As Needed 6/17/2018     Sig - Route: Infuse 1-10 mL into a venous catheter As Needed for Line Care. - Intravenous    Cosign for Ordering: Accepted by Josafat Campbell DO on 6/17/2018 11:28 AM    sodium chloride 0.9 % flush 10 mL 10 mL As Needed 6/17/2018     Sig - Route: Infuse 10 mL into a venous catheter As Needed for Line Care. - Intravenous    Cosign for Ordering: Accepted by Dianne Cazares DO on 6/17/2018  9:35 PM    Linked Group 1:  \"And\" Linked Group Details        sodium chloride 0.9 % infusion 100 mL/hr Continuous 6/17/2018     Sig - Route: Infuse 100 mL/hr into a venous catheter Continuous. - Intravenous    Cosign for Ordering: Accepted by Josafat Campbell DO on 6/17/2018 11:28 AM    sodium hypochlorite (DAKIN'S 1/4 STRENGTH) 0.125 % topical solution 0.125% solution  2 Times Daily 6/18/2018     Sig - Route: Apply  topically 2 (Two) Times a Day. - Topical            Cultures:    Blood Culture   Date Value Ref Range Status   06/17/2018 No growth at 24 hours  Preliminary   06/17/2018 No growth at 24 hours  Preliminary         ASSESSMENT/PLAN           Assessment/Plan     ASSESSMENT:       1.  Sepsis  2.  Pyelonephritis     PLAN:     Comfortable this morning with no complaints.  CRP showing significant improvement.  Urine culture some progress.  No fever overnight.  Patient refused debridement of coccyx wound.    In the setting of pyelonephritis and based on previous culture results would recommend cefepime 2g IV q 12 hours.  We'll follow culture results and adjust antibiotic therapy appropriately.  Tolerating therapy so " far. Hope to de-escalate soon.     Patient's findings and recommendations were discussed with patient and nursing staff    Code Status:   Code Status and Medical Interventions:   Ordered at: 06/19/18 0951     Code Status:    CPR     Medical Interventions (Level of Support Prior to Arrest):    Full       Milagro Vasquez PA-C  06/19/18  10:54 AM

## 2018-06-19 NOTE — NURSING NOTE
Pts BG was 74 in her 2 am lab work. Pt has been NPO since midnight. Checked pts glucose 68... She states she does not want to have surgery, that she prefers to go to the Wound care clinic in [place].Pt drank an apple juice and a breakfast tray ordered for pt.
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
## 2018-06-20 LAB
ANION GAP SERPL CALCULATED.3IONS-SCNC: 8.3 MMOL/L (ref 3.6–11.2)
BASOPHILS # BLD AUTO: 0.03 10*3/MM3 (ref 0–0.3)
BASOPHILS NFR BLD AUTO: 0.7 % (ref 0–2)
BUN BLD-MCNC: 17 MG/DL (ref 7–21)
BUN/CREAT SERPL: 9.7 (ref 7–25)
CALCIUM SPEC-SCNC: 7.6 MG/DL (ref 7.7–10)
CHLORIDE SERPL-SCNC: 117 MMOL/L (ref 99–112)
CO2 SERPL-SCNC: 13.7 MMOL/L (ref 24.3–31.9)
CREAT BLD-MCNC: 1.75 MG/DL (ref 0.43–1.29)
CRP SERPL-MCNC: 8.57 MG/DL (ref 0–0.99)
DEPRECATED RDW RBC AUTO: 48.9 FL (ref 37–54)
EOSINOPHIL # BLD AUTO: 0.14 10*3/MM3 (ref 0–0.7)
EOSINOPHIL NFR BLD AUTO: 3.5 % (ref 0–5)
ERYTHROCYTE [DISTWIDTH] IN BLOOD BY AUTOMATED COUNT: 14.8 % (ref 11.5–14.5)
GFR SERPL CREATININE-BSD FRML MDRD: 35 ML/MIN/1.73
GLUCOSE BLD-MCNC: 98 MG/DL (ref 70–110)
HCT VFR BLD AUTO: 29.9 % (ref 37–47)
HGB BLD-MCNC: 8.7 G/DL (ref 12–16)
IMM GRANULOCYTES # BLD: 0.05 10*3/MM3 (ref 0–0.03)
IMM GRANULOCYTES NFR BLD: 1.2 % (ref 0–0.5)
LYMPHOCYTES # BLD AUTO: 1 10*3/MM3 (ref 1–3)
LYMPHOCYTES NFR BLD AUTO: 24.7 % (ref 21–51)
MCH RBC QN AUTO: 28.2 PG (ref 27–33)
MCHC RBC AUTO-ENTMCNC: 29.1 G/DL (ref 33–37)
MCV RBC AUTO: 96.8 FL (ref 80–94)
MONOCYTES # BLD AUTO: 0.65 10*3/MM3 (ref 0.1–0.9)
MONOCYTES NFR BLD AUTO: 16 % (ref 0–10)
NEUTROPHILS # BLD AUTO: 2.18 10*3/MM3 (ref 1.4–6.5)
NEUTROPHILS NFR BLD AUTO: 53.9 % (ref 30–70)
OSMOLALITY SERPL CALC.SUM OF ELEC: 279.1 MOSM/KG (ref 273–305)
PLATELET # BLD AUTO: 234 10*3/MM3 (ref 130–400)
PMV BLD AUTO: 9.4 FL (ref 6–10)
POTASSIUM BLD-SCNC: 4.2 MMOL/L (ref 3.5–5.3)
RBC # BLD AUTO: 3.09 10*6/MM3 (ref 4.2–5.4)
SODIUM BLD-SCNC: 139 MMOL/L (ref 135–153)
WBC NRBC COR # BLD: 4.05 10*3/MM3 (ref 4.5–12.5)

## 2018-06-20 PROCEDURE — 25010000002 HEPARIN (PORCINE) PER 1000 UNITS: Performed by: PHYSICIAN ASSISTANT

## 2018-06-20 PROCEDURE — 99253 IP/OBS CNSLTJ NEW/EST LOW 45: CPT | Performed by: UROLOGY

## 2018-06-20 PROCEDURE — 94799 UNLISTED PULMONARY SVC/PX: CPT

## 2018-06-20 PROCEDURE — 80048 BASIC METABOLIC PNL TOTAL CA: CPT | Performed by: PHYSICIAN ASSISTANT

## 2018-06-20 PROCEDURE — 25010000002 CEFEPIME: Performed by: PHYSICIAN ASSISTANT

## 2018-06-20 PROCEDURE — 85025 COMPLETE CBC W/AUTO DIFF WBC: CPT | Performed by: PHYSICIAN ASSISTANT

## 2018-06-20 PROCEDURE — 86140 C-REACTIVE PROTEIN: CPT | Performed by: PHYSICIAN ASSISTANT

## 2018-06-20 PROCEDURE — 99232 SBSQ HOSP IP/OBS MODERATE 35: CPT | Performed by: HOSPITALIST

## 2018-06-20 PROCEDURE — 99231 SBSQ HOSP IP/OBS SF/LOW 25: CPT | Performed by: SURGERY

## 2018-06-20 RX ADMIN — CEFEPIME 2 G: 2 INJECTION, POWDER, FOR SOLUTION INTRAVENOUS at 17:29

## 2018-06-20 RX ADMIN — DOCUSATE SODIUM AND SENNOSIDES 2 TABLET: 8.6; 5 TABLET, FILM COATED ORAL at 20:28

## 2018-06-20 RX ADMIN — COLLAGENASE SANTYL: 250 OINTMENT TOPICAL at 08:33

## 2018-06-20 RX ADMIN — Medication 1 CAPSULE: at 08:33

## 2018-06-20 RX ADMIN — CEFEPIME 2 G: 2 INJECTION, POWDER, FOR SOLUTION INTRAVENOUS at 04:42

## 2018-06-20 RX ADMIN — HEPARIN SODIUM 5000 UNITS: 5000 INJECTION, SOLUTION INTRAVENOUS; SUBCUTANEOUS at 08:33

## 2018-06-20 NOTE — PROGRESS NOTES
Discharge Planning Assessment  [facility]     Patient Name: Awais Dorman  MRN: [id number]  Today's Date: 6/20/2018    Admit Date: 6/17/2018      Discharge Plan     Row Name 06/20/18 1138       Plan    Plan SS received consult pt is wanting NHP in [place]. SS spoke to pt who states wanting to visit local nursing home's before making decision about placement. SS provided pt with contact information for nursing home's in [facility]. Pt states plans to return home with spouse at discharge. SS discussed pt with Dr. Linda who is recommending a speciality cushion for wheelchair and an alternating pressure pump and pad for hospital bed. SS attempted contact with emergency contact, Ananth Hernandez at [phone] without success. SS to follow.     11:55 SS contacted [facility] [phone] per Georgie who states pt did not get DME from them. SS contacted [facility] [phone] per Cal who states pt did not get DME from them. SS contacted [facility] [phone] per Parris who states pt did not get DME from them. SS attempted contact with emergency contact, Ananth Hernandez at [phone] without success. SS to follow.     17:24 SS spoke to pt who states her hospital bed is from [facility] in [place] and her electric wheelchair is from [facility] in [place]. Pt states having a roho cushion for her electric wheelchair and she recently received w/c. SS to contact [facility] on Thursday, 6-21-18 regarding hospital bed mattress. SS to follow.           Karena Amado

## 2018-06-20 NOTE — PROGRESS NOTES
[facility] HOSPITALIST PROGRESS NOTE     Patient Identification:  Name:  Awais Dorman  Age:  27 y.o.  Sex:  female  :  1990  MRN:  [id number]  Visit Number:  [id number]  Primary Care Provider:  No Known Provider    Length of stay:  3    Subjective:  Patient is afebrile, she was able to tolerate her meals this morning without any nausea or vomiting, culture so far is nonyielding, urology consult has been requested for difficult Esposito catheter insertion.  Patient will inform her  today to make an appointment with wound care at [facility].  KUB shows large amount of stools left side.    Chief Complaint: Nausea  ----------------------------------------------------------------------------------------------------------------------  Current Hospital Meds:    cefepime 2 g Intravenous Q12H   collagenase  Topical Q24H   heparin (porcine) 5,000 Units Subcutaneous Q12H   lactobacillus acidophilus 1 capsule Oral Daily   sennosides-docusate sodium 2 tablet Oral Nightly       sodium chloride 100 mL/hr Last Rate: 100 mL/hr (18)     ----------------------------------------------------------------------------------------------------------------------  Vital Signs:  Temp:  [98 °F (36.7 °C)-98.6 °F (37 °C)] 98.1 °F (36.7 °C)  Heart Rate:  [78-88] 88  Resp:  [] 16  BP: ()/(55-86) 99/63       Tele:   1    18  0423 18  0930   Weight: 45.4 kg (100 lb) 44.7 kg (98 lb 9.6 oz)     Body mass index is 15.44 kg/m².    Intake/Output Summary (Last 24 hours) at 18 1121  Last data filed at 18 0545   Gross per 24 hour   Intake             1030 ml   Output                0 ml   Net             1030 ml     Diet Regular  ----------------------------------------------------------------------------------------------------------------------  Physical exam:  General: Comfortable,awake, alert, oriented to self, place, and time,  No respiratory distress.    Skin:  Skin is  warm and dry. No rash noted. Slightly pallor.    HENT:  Head:  Normocephalic and atraumatic.  Mouth:  Moist mucous membranes.    Eyes:  Conjunctivae and EOM are normal.  Pupils are equal, round, and reactive to light.  No scleral icterus.    Neck:  Neck supple.  No JVD present.    Pulmonary/Chest:  No respiratory distress, no wheezes, no crackles, with normal breath sounds and good air movement.  Cardiovascular:  Normal rate, regular rhythm and normal heart sounds with no murmur.  Abdominal:  Soft.  Bowel sounds are normal.  No distension and no tenderness.   Extremities:  Significant deformities of both hands from contracture, good radial and pedal pulses, no edema lower extremity.    Back: Gluteal and sacral decubiti with dressing.  No redness along the margins      ----------------------------------------------------------------------------------------------------------------------  ----------------------------------------------------------------------------------------------------------------------        Results from last 7 days  Lab Units 06/20/18  0140 06/19/18  0157 06/18/18  0128 06/17/18  0454   CRP mg/dL 8.57* 15.72* 24.12*  --    LACTATE mmol/L  --   --   --  0.6   WBC 10*3/mm3 4.05* 4.17* 8.21 16.70*   HEMOGLOBIN g/dL 8.7* 7.9* 7.8* 9.8*   HEMATOCRIT % 29.9* 27.3* 26.1* 31.4*   MCV fL 96.8* 98.2* 93.5 94.0   MCHC g/dL 29.1* 28.9* 29.9* 31.2*   PLATELETS 10*3/mm3 234 262 301 422*           Results from last 7 days  Lab Units 06/20/18  0140 06/19/18  0157 06/18/18  0128 06/17/18  0454   SODIUM mmol/L 139 138 136 135   POTASSIUM mmol/L 4.2 4.1 3.9 3.7   CHLORIDE mmol/L 117* 116* 112 108   CO2 mmol/L 13.7* 12.9* 16.1* 18.9*   BUN mg/dL 17 19 19 20   CREATININE mg/dL 1.75* 1.76* 1.73* 1.90*   EGFR IF NONAFRICN AM mL/min/1.73 35* 35* 36* 32*   CALCIUM mg/dL 7.6* 7.4* 8.0 8.8   GLUCOSE mg/dL 98 74 90 102   ALBUMIN g/dL  --   --   --  3.50   BILIRUBIN mg/dL  --   --   --  0.9   ALK PHOS U/L  --   --   --  83    AST (SGOT) U/L  --   --   --  10   ALT (SGPT) U/L  --   --   --  3*   Estimated Creatinine Clearance: 34.4 mL/min (A) (by C-G formula based on SCr of 1.75 mg/dL (H)).    No results found for: AMMONIA      Blood Culture   Date Value Ref Range Status   06/17/2018 No growth at 3 days  Preliminary   06/17/2018 No growth at 3 days  Preliminary     Urine Culture   Date Value Ref Range Status   06/17/2018 Culture in progress  Preliminary             I have personally looked at the labs and they are summarized above.  ----------------------------------------------------------------------------------------------------------------------  Imaging Results (last 24 hours)     Procedure Component Value Units Date/Time    XR Abdomen KUB [[id number]] Collected:  06/19/18 1528     Updated:  06/19/18 1542    Narrative:       XR ABDOMEN KUB-      CLINICAL INDICATION: Abdominal pain; N10-Acute pyelonephritis.       COMPARISON: None available.     FINDINGS: One view of the abdomen shows moderate to large volume stool  in the left colon.     No evidence of bowel obstruction.     No acute bony abnormality.      This report was finalized on 6/19/2018 3:40 PM by Dr. Chivo Davison MD.           ----------------------------------------------------------------------------------------------------------------------  Assessment and Plan:  - Sepsis from right-sided acute pyelonephritis, clinically improving  - Gluteal and sacral decubiti refuse debridement, appointment will be made for outpatient wound patient's preference  - CKD stage III  - Constipation    Continue antibiotic, local wound care, DC IV fluids, follow up with urology consult for difficult Esposito catheter insertion, follow-up cultures, address constipation with enemas.        Ana Lobato MD  06/20/18  11:21 AM
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
## 2018-06-20 NOTE — PROGRESS NOTES
Decubitus ulcers    She is apparently planning on returning home at this time and will need follow up with her wound care clinic in [place] outpatient. Also, if she could get an alternating pressure mattress and/or something better for cushioning of her wheelchair it would be helpful. I will see her prn.

## 2018-06-20 NOTE — PROGRESS NOTES
"  I have personally seen and examined the patient today and discussed overnight interval progress and pertinent issues with nursing staff.    Subjective       Patient reports she is feeling better.  CRP showing improvement from 15.72 down to 8.57.  Urine culture in progress.  Recommend to continue current antibiotic therapy while awaiting susceptibilities to be finalized.      History taken from: patient chart RN      Objective       Vital Signs    BP 99/63 (BP Location: Left arm, Patient Position: Lying)   Pulse 88   Temp 98.1 °F (36.7 °C) (Oral)   Resp 16   Ht 170.2 cm (67\")   Wt 44.7 kg (98 lb 9.6 oz)   SpO2 97%   BMI 15.44 kg/m²     Temp:  [98 °F (36.7 °C)-98.6 °F (37 °C)] 98.1 °F (36.7 °C)      Intake/Output Summary (Last 24 hours) at 06/20/18 1128  Last data filed at 06/20/18 0545   Gross per 24 hour   Intake             1030 ml   Output                0 ml   Net             1030 ml     Intake & Output (last 3 days)       06/17 0701 - 06/18 0700 06/18 0701 - 06/19 0700 06/19 0701 - 06/20 0700 06/20 0701 - 06/21 0700    P.O. 840 460 390     I.V. (mL/kg) 850 (19) 850 (19) 850 (19)     IV Piggyback 1100       Total Intake(mL/kg) 2790 (62.4) 1310 (29.3) 1240 (27.7)     Urine (mL/kg/hr)  600 (0.6) 300 (0.3)     Stool  0 (0)      Total Output   600 300      Net +2790 +710 +940              Unmeasured Urine Occurrence 6 x 1 x 1 x     Unmeasured Stool Occurrence 1 x 1 x            Physical Exam:                 General Appearance:    Alert, cooperative, in no acute distress   Head:    Normocephalic, without obvious abnormality, atraumatic   Eyes:            Lids and lashes normal, conjunctivae and sclerae normal, no   icterus, no pallor, corneas clear, PERRLA   Ears:    Ears appear intact with no abnormalities noted   Throat:   No oral lesions, no thrush, oral mucosa moist   Neck:   No adenopathy, supple, trachea midline, no thyromegaly, no   carotid bruit, no JVD   Back:     No tenderness to percussion or " palpation, range of motion   normal   Lungs:     Clear to auscultation,respirations regular, even and unlabored. No wheezing, no ronchi and no crackles.    Heart:    Regular rhythm and normal rate, normal S1 and S2, no            murmur, no gallop, no rub, no click   Chest Wall:    No abnormalities observed   Abdomen:     Normal bowel sounds, no masses, no organomegaly, soft        non-tender, non-distended, no guarding, no rebound                tenderness   Rectal:     Deferred   Extremities:   Quadraplegic, hands contracted   Pulses:   Pulses palpable and equal bilaterally   Skin:   Chronic ongoing coccyx wound    Lymph nodes:   No palpable adenopathy   Neurologic:   Awake, alert and oriented x 3. Following commands.         Results:      Results from last 7 days  Lab Units 06/20/18  0140 06/19/18  0157 06/18/18  0128 06/17/18  0454   WBC 10*3/mm3 4.05* 4.17* 8.21 16.70*     Lab Results   Component Value Date    NEUTROABS 2.18 06/20/2018         Results from last 7 days  Lab Units 06/20/18  0140   CREATININE mg/dL 1.75*         Results from last 7 days  Lab Units 06/20/18  0140 06/19/18  0157 06/18/18  0128   CRP mg/dL 8.57* 15.72* 24.12*     Results Review:    I have personally reviewed laboratory data, culture results, radiology studies and antimicrobial therapy.    Hospital Medications (active)       Dose Frequency Start End    acetaminophen (TYLENOL) tablet 650 mg 650 mg Once 6/17/2018 6/17/2018    Sig - Route: Take 2 tablets by mouth 1 (One) Time. - Oral    cefepime (MAXIPIME) 2 g/100 mL 0.9% NS (mbp) 2 g Every 12 Hours 6/17/2018 6/24/2018    Sig - Route: Infuse 100 mL into a venous catheter Every 12 (Twelve) Hours. - Intravenous    Cosign for Ordering: Accepted by Toi Hodgson MD on 6/18/2018  8:47 AM    heparin (porcine) 5000 UNIT/ML injection 5,000 Units 5,000 Units Every 12 Hours Scheduled 6/17/2018     Sig - Route: Inject 1 mL under the skin Every 12 (Twelve) Hours. - Subcutaneous    Cosign for  "Ordering: Accepted by Josafat Campbell DO on 6/17/2018 11:28 AM    lactobacillus acidophilus (RISAQUAD) capsule 1 capsule 1 capsule Daily 6/17/2018     Sig - Route: Take 1 capsule by mouth Daily. - Oral    ondansetron (ZOFRAN) injection 4 mg 4 mg Every 6 Hours PRN 6/17/2018     Sig - Route: Infuse 2 mL into a venous catheter Every 6 (Six) Hours As Needed for Nausea or Vomiting. - Intravenous    sodium chloride 0.9 % flush 1-10 mL 1-10 mL As Needed 6/17/2018     Sig - Route: Infuse 1-10 mL into a venous catheter As Needed for Line Care. - Intravenous    Cosign for Ordering: Accepted by Josafat Campbell DO on 6/17/2018 11:28 AM    sodium chloride 0.9 % flush 10 mL 10 mL As Needed 6/17/2018     Sig - Route: Infuse 10 mL into a venous catheter As Needed for Line Care. - Intravenous    Cosign for Ordering: Accepted by Dianne Cazares DO on 6/17/2018  9:35 PM    Linked Group 1:  \"And\" Linked Group Details        sodium chloride 0.9 % infusion 100 mL/hr Continuous 6/17/2018     Sig - Route: Infuse 100 mL/hr into a venous catheter Continuous. - Intravenous    Cosign for Ordering: Accepted by Josafat Campbell DO on 6/17/2018 11:28 AM    sodium hypochlorite (DAKIN'S 1/4 STRENGTH) 0.125 % topical solution 0.125% solution  2 Times Daily 6/18/2018     Sig - Route: Apply  topically 2 (Two) Times a Day. - Topical            Cultures:    Blood Culture   Date Value Ref Range Status   06/17/2018 No growth at 24 hours  Preliminary   06/17/2018 No growth at 24 hours  Preliminary         ASSESSMENT/PLAN           Assessment/Plan     ASSESSMENT:       1.  Sepsis  2.  Pyelonephritis     PLAN:     Patient reports she is feeling better.  CRP showing improvement from 15.72 down to 8.57.  Urine culture in progress.  Recommend to continue current antibiotic therapy while awaiting susceptibilities to be finalized.    In the setting of pyelonephritis and based on previous culture results would recommend cefepime 2g IV q " 12 hours.      Patient's findings and recommendations were discussed with patient and nursing staff    Code Status:   Code Status and Medical Interventions:   Ordered at: 06/19/18 0951     Code Status:    CPR     Medical Interventions (Level of Support Prior to Arrest):    Full       Milagro Vasquez PA-C  06/20/18  11:28 AM    Physician Attestation:    I have personally seen and examined the patient. I reviewed the patient's data including history of present illness, review of systems, physical examination, assessment and treatment plan and agree with findings above. The assessment and plan are my own.  I have reviewed and edited the note above after discussing the findings with the midlevel.    Toi Hodgson MD  Infectious Diseases  06/21/18  4:50 AM

## 2018-06-21 LAB
ANION GAP SERPL CALCULATED.3IONS-SCNC: 5.8 MMOL/L (ref 3.6–11.2)
BACTERIA SPEC AEROBE CULT: ABNORMAL
BACTERIA SPEC AEROBE CULT: ABNORMAL
BASOPHILS # BLD AUTO: 0.03 10*3/MM3 (ref 0–0.3)
BASOPHILS NFR BLD AUTO: 0.7 % (ref 0–2)
BUN BLD-MCNC: 17 MG/DL (ref 7–21)
BUN/CREAT SERPL: 9.7 (ref 7–25)
CALCIUM SPEC-SCNC: 7.5 MG/DL (ref 7.7–10)
CHLORIDE SERPL-SCNC: 114 MMOL/L (ref 99–112)
CO2 SERPL-SCNC: 17.2 MMOL/L (ref 24.3–31.9)
CREAT BLD-MCNC: 1.76 MG/DL (ref 0.43–1.29)
CRP SERPL-MCNC: 5 MG/DL (ref 0–0.99)
DEPRECATED RDW RBC AUTO: 47.9 FL (ref 37–54)
EOSINOPHIL # BLD AUTO: 0.2 10*3/MM3 (ref 0–0.7)
EOSINOPHIL NFR BLD AUTO: 4.4 % (ref 0–5)
ERYTHROCYTE [DISTWIDTH] IN BLOOD BY AUTOMATED COUNT: 14.8 % (ref 11.5–14.5)
GFR SERPL CREATININE-BSD FRML MDRD: 35 ML/MIN/1.73
GLUCOSE BLD-MCNC: 101 MG/DL (ref 70–110)
HCT VFR BLD AUTO: 27.1 % (ref 37–47)
HGB BLD-MCNC: 8 G/DL (ref 12–16)
IMM GRANULOCYTES # BLD: 0.04 10*3/MM3 (ref 0–0.03)
IMM GRANULOCYTES NFR BLD: 0.9 % (ref 0–0.5)
LYMPHOCYTES # BLD AUTO: 1.54 10*3/MM3 (ref 1–3)
LYMPHOCYTES NFR BLD AUTO: 34.1 % (ref 21–51)
MCH RBC QN AUTO: 28.1 PG (ref 27–33)
MCHC RBC AUTO-ENTMCNC: 29.5 G/DL (ref 33–37)
MCV RBC AUTO: 95.1 FL (ref 80–94)
MONOCYTES # BLD AUTO: 0.53 10*3/MM3 (ref 0.1–0.9)
MONOCYTES NFR BLD AUTO: 11.8 % (ref 0–10)
NEUTROPHILS # BLD AUTO: 2.17 10*3/MM3 (ref 1.4–6.5)
NEUTROPHILS NFR BLD AUTO: 48.1 % (ref 30–70)
OSMOLALITY SERPL CALC.SUM OF ELEC: 275.5 MOSM/KG (ref 273–305)
PLATELET # BLD AUTO: 225 10*3/MM3 (ref 130–400)
PMV BLD AUTO: 10.2 FL (ref 6–10)
POTASSIUM BLD-SCNC: 3.7 MMOL/L (ref 3.5–5.3)
RBC # BLD AUTO: 2.85 10*6/MM3 (ref 4.2–5.4)
SODIUM BLD-SCNC: 137 MMOL/L (ref 135–153)
WBC NRBC COR # BLD: 4.51 10*3/MM3 (ref 4.5–12.5)

## 2018-06-21 PROCEDURE — 25010000002 HEPARIN (PORCINE) PER 1000 UNITS: Performed by: PHYSICIAN ASSISTANT

## 2018-06-21 PROCEDURE — 85025 COMPLETE CBC W/AUTO DIFF WBC: CPT | Performed by: PHYSICIAN ASSISTANT

## 2018-06-21 PROCEDURE — 94799 UNLISTED PULMONARY SVC/PX: CPT

## 2018-06-21 PROCEDURE — 99232 SBSQ HOSP IP/OBS MODERATE 35: CPT | Performed by: HOSPITALIST

## 2018-06-21 PROCEDURE — 25010000002 CEFEPIME: Performed by: PHYSICIAN ASSISTANT

## 2018-06-21 PROCEDURE — 86140 C-REACTIVE PROTEIN: CPT | Performed by: PHYSICIAN ASSISTANT

## 2018-06-21 PROCEDURE — 80048 BASIC METABOLIC PNL TOTAL CA: CPT | Performed by: PHYSICIAN ASSISTANT

## 2018-06-21 RX ORDER — CEFDINIR 300 MG/1
300 CAPSULE ORAL EVERY 12 HOURS SCHEDULED
Status: DISCONTINUED | OUTPATIENT
Start: 2018-06-21 | End: 2018-06-22 | Stop reason: HOSPADM

## 2018-06-21 RX ADMIN — CEFDINIR 300 MG: 300 CAPSULE ORAL at 21:49

## 2018-06-21 RX ADMIN — DOCUSATE SODIUM AND SENNOSIDES 2 TABLET: 8.6; 5 TABLET, FILM COATED ORAL at 21:49

## 2018-06-21 RX ADMIN — Medication 1 CAPSULE: at 08:34

## 2018-06-21 RX ADMIN — CEFEPIME 2 G: 2 INJECTION, POWDER, FOR SOLUTION INTRAVENOUS at 05:41

## 2018-06-21 RX ADMIN — CEFDINIR 300 MG: 300 CAPSULE ORAL at 14:03

## 2018-06-21 RX ADMIN — COLLAGENASE SANTYL: 250 OINTMENT TOPICAL at 08:35

## 2018-06-21 RX ADMIN — HEPARIN SODIUM 5000 UNITS: 5000 INJECTION, SOLUTION INTRAVENOUS; SUBCUTANEOUS at 08:34

## 2018-06-21 NOTE — PROGRESS NOTES
Discharge Planning Assessment   [facility]     Patient Name: Awais Dorman  MRN: [id number]  Today's Date: 6/21/2018    Admit Date: 6/17/2018      Discharge Plan     Row Name 06/21/18 1616       Plan    Plan SS contacted [facility] [phone] per Keith who request wound care notes, face sheet, & H&P for review. Pt currently has a group 2 mattress. SS faxed face sheet, wound care notes, H&P, and operative report to [facility] [phone]. SS to follow.           Karena Amado
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
## 2018-06-21 NOTE — PROGRESS NOTES
"  I have personally seen and examined the patient today and discussed overnight interval progress and pertinent issues with nursing staff.    Subjective     Patient is awake and alert this morning.  States that she feels some better today with no pertinent issues or complaints.  Significantly improved CRP level down from 8.5-5.0 with normal white count.  No fever or diarrhea reported overnight.  Urine culture collected on 6/17/18 finalized as greater than 100,000 colonies of providencia and Escherichia coli.      History taken from: patient chart RN      Objective       Vital Signs    BP (!) 80/40 (BP Location: Left arm, Patient Position: Lying)   Pulse 85   Temp 98.7 °F (37.1 °C) (Oral)   Resp 16   Ht 170.2 cm (67\")   Wt 44.7 kg (98 lb 9.6 oz)   SpO2 99%   BMI 15.44 kg/m²     Temp:  [97.6 °F (36.4 °C)-98.8 °F (37.1 °C)] 98.7 °F (37.1 °C)      Intake/Output Summary (Last 24 hours) at 06/21/18 1105  Last data filed at 06/21/18 0900   Gross per 24 hour   Intake              580 ml   Output                0 ml   Net              580 ml     Intake & Output (last 3 days)       06/18 0701 - 06/19 0700 06/19 0701 - 06/20 0700 06/20 0701 - 06/21 0700 06/21 0701 - 06/22 0700    P.O. 460 390 460 120    I.V. (mL/kg) 850 (19) 850 (19)      IV Piggyback        Total Intake(mL/kg) 1310 (29.3) 1240 (27.7) 460 (10.3) 120 (2.7)    Urine (mL/kg/hr) 600 (0.6) 300 (0.3)      Stool 0 (0)       Total Output 600 300        Net +710 +940 +460 +120            Unmeasured Urine Occurrence 1 x 1 x 6 x     Unmeasured Stool Occurrence 1 x  0 x           Physical Exam:                 General Appearance:    Alert, cooperative, in no acute distress   Head:    Normocephalic, without obvious abnormality, atraumatic   Eyes:            Lids and lashes normal, conjunctivae and sclerae normal, no   icterus, no pallor, corneas clear, PERRLA   Ears:    Ears appear intact with no abnormalities noted   Throat:   No oral lesions, no thrush, oral " mucosa moist   Neck:   No adenopathy, supple, trachea midline, no thyromegaly, no   carotid bruit, no JVD   Back:     No tenderness to percussion or palpation, range of motion   normal   Lungs:     Clear to auscultation,respirations regular, even and unlabored. No wheezing, no ronchi and no crackles.    Heart:    Regular rhythm and normal rate, normal S1 and S2, no            murmur, no gallop, no rub, no click   Chest Wall:    No abnormalities observed   Abdomen:     Normal bowel sounds, no masses, no organomegaly, soft        non-tender, non-distended, no guarding, no rebound                tenderness   Rectal:     Deferred   Extremities:   Quadraplegic, hands contracted   Pulses:   Pulses palpable and equal bilaterally   Skin:   Chronic ongoing coccyx wound    Lymph nodes:   No palpable adenopathy   Neurologic:   Awake, alert and oriented x 3. Following commands.         Results:      Results from last 7 days  Lab Units 06/21/18  0036 06/20/18  0140 06/19/18  0157 06/18/18  0128 06/17/18  0454   WBC 10*3/mm3 4.51 4.05* 4.17* 8.21 16.70*     Lab Results   Component Value Date    NEUTROABS 2.17 06/21/2018         Results from last 7 days  Lab Units 06/21/18  0036   CREATININE mg/dL 1.76*         Results from last 7 days  Lab Units 06/21/18  0036 06/20/18  0140 06/19/18  0157   CRP mg/dL 5.00* 8.57* 15.72*     Results Review:    I have personally reviewed laboratory data, culture results, radiology studies and antimicrobial therapy.    Hospital Medications (active)       Dose Frequency Start End    acetaminophen (TYLENOL) tablet 650 mg 650 mg Once 6/17/2018 6/17/2018    Sig - Route: Take 2 tablets by mouth 1 (One) Time. - Oral    cefepime (MAXIPIME) 2 g/100 mL 0.9% NS (mbp) 2 g Every 12 Hours 6/17/2018 6/24/2018    Sig - Route: Infuse 100 mL into a venous catheter Every 12 (Twelve) Hours. - Intravenous    Cosign for Ordering: Accepted by Toi Hodgson MD on 6/18/2018  8:47 AM    heparin (porcine) 5000 UNIT/ML  "injection 5,000 Units 5,000 Units Every 12 Hours Scheduled 6/17/2018     Sig - Route: Inject 1 mL under the skin Every 12 (Twelve) Hours. - Subcutaneous    Cosign for Ordering: Accepted by Josafat Campbell DO on 6/17/2018 11:28 AM    lactobacillus acidophilus (RISAQUAD) capsule 1 capsule 1 capsule Daily 6/17/2018     Sig - Route: Take 1 capsule by mouth Daily. - Oral    ondansetron (ZOFRAN) injection 4 mg 4 mg Every 6 Hours PRN 6/17/2018     Sig - Route: Infuse 2 mL into a venous catheter Every 6 (Six) Hours As Needed for Nausea or Vomiting. - Intravenous    sodium chloride 0.9 % flush 1-10 mL 1-10 mL As Needed 6/17/2018     Sig - Route: Infuse 1-10 mL into a venous catheter As Needed for Line Care. - Intravenous    Cosign for Ordering: Accepted by Josafat Campbell DO on 6/17/2018 11:28 AM    sodium chloride 0.9 % flush 10 mL 10 mL As Needed 6/17/2018     Sig - Route: Infuse 10 mL into a venous catheter As Needed for Line Care. - Intravenous    Cosign for Ordering: Accepted by Dianne Cazares DO on 6/17/2018  9:35 PM    Linked Group 1:  \"And\" Linked Group Details        sodium chloride 0.9 % infusion 100 mL/hr Continuous 6/17/2018     Sig - Route: Infuse 100 mL/hr into a venous catheter Continuous. - Intravenous    Cosign for Ordering: Accepted by Josafat Campbell DO on 6/17/2018 11:28 AM    sodium hypochlorite (DAKIN'S 1/4 STRENGTH) 0.125 % topical solution 0.125% solution  2 Times Daily 6/18/2018     Sig - Route: Apply  topically 2 (Two) Times a Day. - Topical            Cultures:    Blood Culture   Date Value Ref Range Status   06/17/2018 No growth at 24 hours  Preliminary   06/17/2018 No growth at 24 hours  Preliminary         ASSESSMENT/PLAN           Assessment/Plan     ASSESSMENT:       1.  Sepsis  2.  Pyelonephritis     PLAN:     Patient is awake and alert this morning.  States that she feels some better today with no pertinent issues or complaints.  Significantly improved CRP level " down from 8.5-5.0 with normal white count.  No fever or diarrhea reported overnight.  Urine culture collected on 6/17/18 finalized as greater than 100,000 colonies of providencia and Escherichia coli.      Urine culture elected on 6/17/18 finalized as greater than 100,000 colonies prevent she and 100,000 colonies of Escherichia coli susceptible to cefazolin and cefepime.    Cefepime was de-escalated to Omnicef 100 mg by mouth twice a day through 6/28/18.    Patient's findings and recommendations were discussed with patient and nursing staff    Code Status:   Code Status and Medical Interventions:   Ordered at: 06/19/18 0951     Code Status:    CPR     Medical Interventions (Level of Support Prior to Arrest):    Full       JANEE Cheema  06/21/18  11:05 AM

## 2018-06-22 VITALS
OXYGEN SATURATION: 95 % | HEIGHT: 67 IN | DIASTOLIC BLOOD PRESSURE: 59 MMHG | RESPIRATION RATE: 18 BRPM | BODY MASS INDEX: 15.47 KG/M2 | TEMPERATURE: 97.9 F | WEIGHT: 98.6 LBS | SYSTOLIC BLOOD PRESSURE: 96 MMHG | HEART RATE: 74 BPM

## 2018-06-22 LAB
ANION GAP SERPL CALCULATED.3IONS-SCNC: 5.7 MMOL/L (ref 3.6–11.2)
BACTERIA SPEC AEROBE CULT: NORMAL
BACTERIA SPEC AEROBE CULT: NORMAL
BASOPHILS # BLD AUTO: 0.03 10*3/MM3 (ref 0–0.3)
BASOPHILS NFR BLD AUTO: 0.6 % (ref 0–2)
BUN BLD-MCNC: 17 MG/DL (ref 7–21)
BUN/CREAT SERPL: 10.1 (ref 7–25)
CALCIUM SPEC-SCNC: 7.6 MG/DL (ref 7.7–10)
CHLORIDE SERPL-SCNC: 112 MMOL/L (ref 99–112)
CO2 SERPL-SCNC: 19.3 MMOL/L (ref 24.3–31.9)
CREAT BLD-MCNC: 1.68 MG/DL (ref 0.43–1.29)
CRP SERPL-MCNC: 3.51 MG/DL (ref 0–0.99)
DEPRECATED RDW RBC AUTO: 48.2 FL (ref 37–54)
EOSINOPHIL # BLD AUTO: 0.21 10*3/MM3 (ref 0–0.7)
EOSINOPHIL NFR BLD AUTO: 4.3 % (ref 0–5)
ERYTHROCYTE [DISTWIDTH] IN BLOOD BY AUTOMATED COUNT: 14.7 % (ref 11.5–14.5)
GFR SERPL CREATININE-BSD FRML MDRD: 37 ML/MIN/1.73
GLUCOSE BLD-MCNC: 79 MG/DL (ref 70–110)
HCT VFR BLD AUTO: 27.5 % (ref 37–47)
HGB BLD-MCNC: 8.3 G/DL (ref 12–16)
IMM GRANULOCYTES # BLD: 0.05 10*3/MM3 (ref 0–0.03)
IMM GRANULOCYTES NFR BLD: 1 % (ref 0–0.5)
LYMPHOCYTES # BLD AUTO: 1.92 10*3/MM3 (ref 1–3)
LYMPHOCYTES NFR BLD AUTO: 39 % (ref 21–51)
MCH RBC QN AUTO: 28.3 PG (ref 27–33)
MCHC RBC AUTO-ENTMCNC: 30.2 G/DL (ref 33–37)
MCV RBC AUTO: 93.9 FL (ref 80–94)
MONOCYTES # BLD AUTO: 0.67 10*3/MM3 (ref 0.1–0.9)
MONOCYTES NFR BLD AUTO: 13.6 % (ref 0–10)
NEUTROPHILS # BLD AUTO: 2.04 10*3/MM3 (ref 1.4–6.5)
NEUTROPHILS NFR BLD AUTO: 41.5 % (ref 30–70)
OSMOLALITY SERPL CALC.SUM OF ELEC: 274.3 MOSM/KG (ref 273–305)
PLATELET # BLD AUTO: 242 10*3/MM3 (ref 130–400)
PMV BLD AUTO: 10.6 FL (ref 6–10)
POTASSIUM BLD-SCNC: 4.5 MMOL/L (ref 3.5–5.3)
RBC # BLD AUTO: 2.93 10*6/MM3 (ref 4.2–5.4)
SODIUM BLD-SCNC: 137 MMOL/L (ref 135–153)
WBC NRBC COR # BLD: 4.92 10*3/MM3 (ref 4.5–12.5)

## 2018-06-22 PROCEDURE — 85025 COMPLETE CBC W/AUTO DIFF WBC: CPT | Performed by: PHYSICIAN ASSISTANT

## 2018-06-22 PROCEDURE — 80048 BASIC METABOLIC PNL TOTAL CA: CPT | Performed by: PHYSICIAN ASSISTANT

## 2018-06-22 PROCEDURE — 94799 UNLISTED PULMONARY SVC/PX: CPT

## 2018-06-22 PROCEDURE — 99239 HOSP IP/OBS DSCHRG MGMT >30: CPT | Performed by: HOSPITALIST

## 2018-06-22 PROCEDURE — 86140 C-REACTIVE PROTEIN: CPT | Performed by: PHYSICIAN ASSISTANT

## 2018-06-22 PROCEDURE — 25010000002 HEPARIN (PORCINE) PER 1000 UNITS: Performed by: PHYSICIAN ASSISTANT

## 2018-06-22 RX ORDER — CEFDINIR 300 MG/1
300 CAPSULE ORAL EVERY 12 HOURS SCHEDULED
Qty: 11 CAPSULE | Refills: 0 | Status: SHIPPED | OUTPATIENT
Start: 2018-06-22 | End: 2018-06-28

## 2018-06-22 RX ADMIN — Medication 1 CAPSULE: at 08:59

## 2018-06-22 RX ADMIN — CEFDINIR 300 MG: 300 CAPSULE ORAL at 08:59

## 2018-06-22 RX ADMIN — COLLAGENASE SANTYL: 250 OINTMENT TOPICAL at 08:59

## 2018-06-22 RX ADMIN — HEPARIN SODIUM 5000 UNITS: 5000 INJECTION, SOLUTION INTRAVENOUS; SUBCUTANEOUS at 08:59

## 2018-06-22 NOTE — PROGRESS NOTES
Discharge Planning Assessment   [place]     Patient Name: Awais Dorman  MRN: [id number]  Today's Date: 6/22/2018    Admit Date: 6/17/2018      Discharge Plan     Row Name 06/22/18 1032       Plan    Plan SS contacted [facility] [phone] per Keith who states receiving information for a speciality mattress. [facility] to fax SS forms that must be signed by  before speciality mattress is approved. SS to follow.               Karena Amado

## 2018-06-22 NOTE — PROGRESS NOTES
Discharge Planning Assessment   [place]     Patient Name: Awais Dorman  MRN: [id number]  Today's Date: 6/22/2018    Admit Date: 6/17/2018      Discharge Plan     Row Name 06/22/18 1457       Plan    Final Discharge Disposition Code 01 - home or self-care    Final Note Pt is being discharged home today. SS received forms from [facility] to have dr. kimball. Dr. Lobato signed forms for speciality mattress and SS faxed forms back to [facility] [phone]. SS contacted [facility] [phone] per Serina who states receiving forms and forms have been submitted to insurance. [facility] will contact pt regarding speciality mattress. [facility] contacted [facility] and pt will not be accepted back due to non-complaince and missing multiple appointments. SS contacted [facility] [phone] per Trudi who states pt was discharged in the past due to non-complaince and will not be accepted back. SS contacted [facility] [phone] per Isabelle and pt will not be accepted due to having [place] Medicaid. SS discussed this with Dr. Lobato and he request nurse to educate pt and spouse about wound care and inform them that pt will need to go to the ED if she has redness or a tempature. Lead nurse, Eun and nurseAme is aware. No other needs identified.           Karena Amado
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
## 2018-06-22 NOTE — PROGRESS NOTES
"  I have personally seen and examined the patient today and discussed overnight interval progress and pertinent issues with nursing staff.    Subjective     CRP continues to improve down from 5.0-3.5 with normal white count.  No fever or diarrhea reported overnight.  Patient denies hematuria or dysuria.      History taken from: patient chart RN      Objective       Vital Signs    BP 90/50 (BP Location: Left arm, Patient Position: Lying)   Pulse 73   Temp 98.3 °F (36.8 °C) (Oral)   Resp 18   Ht 170.2 cm (67\")   Wt 44.7 kg (98 lb 9.6 oz)   SpO2 97%   BMI 15.44 kg/m²     Temp:  [97.9 °F (36.6 °C)-98.7 °F (37.1 °C)] 98.3 °F (36.8 °C)      Intake/Output Summary (Last 24 hours) at 06/22/18 1307  Last data filed at 06/22/18 0900   Gross per 24 hour   Intake              600 ml   Output                0 ml   Net              600 ml     Intake & Output (last 3 days)       06/19 0701 - 06/20 0700 06/20 0701 - 06/21 0700 06/21 0701 - 06/22 0700 06/22 0701 - 06/23 0700    P.O. 390 460 600 240    I.V. (mL/kg) 850 (19)       Total Intake(mL/kg) 1240 (27.7) 460 (10.3) 600 (13.4) 240 (5.4)    Urine (mL/kg/hr) 300 (0.3)       Stool        Total Output 300          Net +940 +460 +600 +240            Unmeasured Urine Occurrence 1 x 6 x 9 x     Unmeasured Stool Occurrence  0 x 0 x           Physical Exam:                 General Appearance:    Alert, cooperative, in no acute distress   Head:    Normocephalic, without obvious abnormality, atraumatic   Eyes:            Lids and lashes normal, conjunctivae and sclerae normal, no   icterus, no pallor, corneas clear, PERRLA   Ears:    Ears appear intact with no abnormalities noted   Throat:   No oral lesions, no thrush, oral mucosa moist   Neck:   No adenopathy, supple, trachea midline, no thyromegaly, no   carotid bruit, no JVD   Back:     No tenderness to percussion or palpation, range of motion   normal   Lungs:     Clear to auscultation,respirations regular, even and unlabored. " No wheezing, no ronchi and no crackles.    Heart:    Regular rhythm and normal rate, normal S1 and S2, no            murmur, no gallop, no rub, no click   Chest Wall:    No abnormalities observed   Abdomen:     Normal bowel sounds, no masses, no organomegaly, soft        non-tender, non-distended, no guarding, no rebound                tenderness   Rectal:     Deferred   Extremities:   Quadraplegic, hands contracted   Pulses:   Pulses palpable and equal bilaterally   Skin:   Chronic ongoing coccyx wound    Lymph nodes:   No palpable adenopathy   Neurologic:   Awake, alert and oriented x 3. Following commands.         Results:      Results from last 7 days  Lab Units 06/22/18  0052 06/21/18  0036 06/20/18  0140 06/19/18  0157 06/18/18  0128 06/17/18  0454   WBC 10*3/mm3 4.92 4.51 4.05* 4.17* 8.21 16.70*     Lab Results   Component Value Date    NEUTROABS 2.04 06/22/2018         Results from last 7 days  Lab Units 06/22/18  0052   CREATININE mg/dL 1.68*         Results from last 7 days  Lab Units 06/22/18  0052 06/21/18  0036 06/20/18  0140   CRP mg/dL 3.51* 5.00* 8.57*     Results Review:    I have personally reviewed laboratory data, culture results, radiology studies and antimicrobial therapy.    Hospital Medications (active)       Dose Frequency Start End    acetaminophen (TYLENOL) tablet 650 mg 650 mg Once 6/17/2018 6/17/2018    Sig - Route: Take 2 tablets by mouth 1 (One) Time. - Oral    cefepime (MAXIPIME) 2 g/100 mL 0.9% NS (mbp) 2 g Every 12 Hours 6/17/2018 6/24/2018    Sig - Route: Infuse 100 mL into a venous catheter Every 12 (Twelve) Hours. - Intravenous    Cosign for Ordering: Accepted by Toi Hodgson MD on 6/18/2018  8:47 AM    heparin (porcine) 5000 UNIT/ML injection 5,000 Units 5,000 Units Every 12 Hours Scheduled 6/17/2018     Sig - Route: Inject 1 mL under the skin Every 12 (Twelve) Hours. - Subcutaneous    Cosign for Ordering: Accepted by Josafat Campbell DO on 6/17/2018 11:28 AM     "lactobacillus acidophilus (RISAQUAD) capsule 1 capsule 1 capsule Daily 6/17/2018     Sig - Route: Take 1 capsule by mouth Daily. - Oral    ondansetron (ZOFRAN) injection 4 mg 4 mg Every 6 Hours PRN 6/17/2018     Sig - Route: Infuse 2 mL into a venous catheter Every 6 (Six) Hours As Needed for Nausea or Vomiting. - Intravenous    sodium chloride 0.9 % flush 1-10 mL 1-10 mL As Needed 6/17/2018     Sig - Route: Infuse 1-10 mL into a venous catheter As Needed for Line Care. - Intravenous    Cosign for Ordering: Accepted by Josafat Campbell DO on 6/17/2018 11:28 AM    sodium chloride 0.9 % flush 10 mL 10 mL As Needed 6/17/2018     Sig - Route: Infuse 10 mL into a venous catheter As Needed for Line Care. - Intravenous    Cosign for Ordering: Accepted by Dianne Cazares DO on 6/17/2018  9:35 PM    Linked Group 1:  \"And\" Linked Group Details        sodium chloride 0.9 % infusion 100 mL/hr Continuous 6/17/2018     Sig - Route: Infuse 100 mL/hr into a venous catheter Continuous. - Intravenous    Cosign for Ordering: Accepted by Josafat Campbell DO on 6/17/2018 11:28 AM    sodium hypochlorite (DAKIN'S 1/4 STRENGTH) 0.125 % topical solution 0.125% solution  2 Times Daily 6/18/2018     Sig - Route: Apply  topically 2 (Two) Times a Day. - Topical            Cultures:    Blood Culture   Date Value Ref Range Status   06/17/2018 No growth at 24 hours  Preliminary   06/17/2018 No growth at 24 hours  Preliminary         ASSESSMENT/PLAN           Assessment/Plan     ASSESSMENT:       1.  Sepsis  2.  Pyelonephritis     PLAN:     CRP continues to improve down from 5.0-3.5 with normal white count.  No fever or diarrhea reported overnight.  Patient denies hematuria or dysuria.      Urine culture elected on 6/17/18 finalized as greater than 100,000 colonies prevent she and 100,000 colonies of Escherichia coli susceptible to cefazolin and cefepime.    Cefepime was de-escalated to Omnicef 100 mg by mouth twice a day " through 6/28/18.    Patient's findings and recommendations were discussed with patient and nursing staff    Code Status:   Code Status and Medical Interventions:   Ordered at: 06/19/18 0951     Code Status:    CPR     Medical Interventions (Level of Support Prior to Arrest):    Full       JANEE Cheema  06/22/18  1:07 PM

## 2018-06-22 NOTE — DISCHARGE INSTRUCTIONS
Apply santyl ointment to buttock decubitus ulcers twice a day. Clean with saline and dress wet to dry, saline moistened gauze covered with dry gauze dressing.    If your wound looks as it it is not healing, has increased redness, edema, foul smell or foul drainage go to your doctor or the emergency room.

## 2019-11-29 NOTE — PLAN OF CARE
Problem: Fall Risk (Adult)  Goal: Absence of Fall   06/20/18 6410   Fall Risk (Adult)   Absence of Fall making progress toward outcome         
Problem: Fall Risk (Adult)  Goal: Identify Related Risk Factors and Signs and Symptoms   06/20/18 2218   Fall Risk (Adult)   Related Risk Factors (Fall Risk) environment unfamiliar     Goal: Absence of Fall   06/20/18 2218   Fall Risk (Adult)   Absence of Fall making progress toward outcome         
Problem: Fall Risk (Adult)  Goal: Identify Related Risk Factors and Signs and Symptoms   06/20/18 7140   Fall Risk (Adult)   Related Risk Factors (Fall Risk) environment unfamiliar         
Problem: Patient Care Overview  Goal: Individualization and Mutuality  Outcome: Ongoing (interventions implemented as appropriate)    Goal: Discharge Needs Assessment  Outcome: Ongoing (interventions implemented as appropriate)    Goal: Interprofessional Rounds/Family Conf  Outcome: Ongoing (interventions implemented as appropriate)      Problem: Infection, Risk/Actual (Adult)  Goal: Identify Related Risk Factors and Signs and Symptoms  Outcome: Ongoing (interventions implemented as appropriate)      Problem: Activity Intolerance (Adult)  Goal: Identify Related Risk Factors and Signs and Symptoms  Outcome: Ongoing (interventions implemented as appropriate)    Goal: Activity Tolerance  Outcome: Ongoing (interventions implemented as appropriate)    Goal: Effective Energy Conservation Techniques  Outcome: Ongoing (interventions implemented as appropriate)      Problem: Mobility, Physical Impaired (Adult)  Goal: Enhanced Mobility Skills  Outcome: Ongoing (interventions implemented as appropriate)        
Problem: Patient Care Overview  Goal: Individualization and Mutuality  Outcome: Ongoing (interventions implemented as appropriate)    Goal: Discharge Needs Assessment  Outcome: Ongoing (interventions implemented as appropriate)    Goal: Interprofessional Rounds/Family Conf  Outcome: Ongoing (interventions implemented as appropriate)      Problem: Urinary Tract Infection (Adult)  Goal: Signs and Symptoms of Listed Potential Problems Will be Absent, Minimized or Managed (Urinary Tract Infection)  Outcome: Ongoing (interventions implemented as appropriate)      Problem: Activity Intolerance (Adult)  Goal: Activity Tolerance  Outcome: Ongoing (interventions implemented as appropriate)        
Problem: Patient Care Overview  Goal: Individualization and Mutuality  Outcome: Ongoing (interventions implemented as appropriate)    Goal: Discharge Needs Assessment  Outcome: Ongoing (interventions implemented as appropriate)    Goal: Interprofessional Rounds/Family Conf  Outcome: Ongoing (interventions implemented as appropriate)      Problem: Urinary Tract Infection (Adult)  Goal: Signs and Symptoms of Listed Potential Problems Will be Absent, Minimized or Managed (Urinary Tract Infection)  Outcome: Ongoing (interventions implemented as appropriate)      Problem: Infection, Risk/Actual (Adult)  Goal: Identify Related Risk Factors and Signs and Symptoms  Outcome: Ongoing (interventions implemented as appropriate)      Problem: Activity Intolerance (Adult)  Goal: Identify Related Risk Factors and Signs and Symptoms  Outcome: Ongoing (interventions implemented as appropriate)    Goal: Activity Tolerance  Outcome: Ongoing (interventions implemented as appropriate)    Goal: Effective Energy Conservation Techniques  Outcome: Ongoing (interventions implemented as appropriate)        
Problem: Patient Care Overview  Goal: Individualization and Mutuality  Outcome: Ongoing (interventions implemented as appropriate)    Goal: Discharge Needs Assessment  Outcome: Ongoing (interventions implemented as appropriate)    Goal: Interprofessional Rounds/Family Conf  Outcome: Ongoing (interventions implemented as appropriate)      Problem: Urinary Tract Infection (Adult)  Goal: Signs and Symptoms of Listed Potential Problems Will be Absent, Minimized or Managed (Urinary Tract Infection)  Outcome: Ongoing (interventions implemented as appropriate)      Problem: Infection, Risk/Actual (Adult)  Goal: Identify Related Risk Factors and Signs and Symptoms  Outcome: Ongoing (interventions implemented as appropriate)      Problem: Activity Intolerance (Adult)  Goal: Identify Related Risk Factors and Signs and Symptoms  Outcome: Ongoing (interventions implemented as appropriate)    Goal: Activity Tolerance  Outcome: Ongoing (interventions implemented as appropriate)    Goal: Effective Energy Conservation Techniques  Outcome: Ongoing (interventions implemented as appropriate)      Problem: Mobility, Physical Impaired (Adult)  Goal: Identify Related Risk Factors and Signs and Symptoms  Outcome: Ongoing (interventions implemented as appropriate)    Goal: Enhanced Mobility Skills  Outcome: Ongoing (interventions implemented as appropriate)    Goal: Enhanced Functional Ability  Outcome: Ongoing (interventions implemented as appropriate)      Problem: Skin Injury Risk (Adult)  Goal: Skin Health and Integrity  Outcome: Ongoing (interventions implemented as appropriate)        
Problem: Patient Care Overview  Goal: Plan of Care Review  Outcome: Ongoing (interventions implemented as appropriate)   06/17/18 1411   Coping/Psychosocial   Plan of Care Reviewed With patient       Problem: Urinary Tract Infection (Adult)  Goal: Signs and Symptoms of Listed Potential Problems Will be Absent, Minimized or Managed (Urinary Tract Infection)  Outcome: Ongoing (interventions implemented as appropriate)      Problem: Infection, Risk/Actual (Adult)  Goal: Identify Related Risk Factors and Signs and Symptoms  Outcome: Ongoing (interventions implemented as appropriate)    Goal: Infection Prevention/Resolution  Outcome: Ongoing (interventions implemented as appropriate)   06/17/18 1411   Infection, Risk/Actual (Adult)   Infection Prevention/Resolution making progress toward outcome       Problem: Wound (Includes Pressure Injury) (Adult)  Goal: Signs and Symptoms of Listed Potential Problems Will be Absent, Minimized or Managed (Wound)  Outcome: Ongoing (interventions implemented as appropriate)   06/17/18 1411   Goal/Outcome Evaluation   Problems Assessed (Wound) infection;delayed wound healing;situational response   Problems Present (Wound) infection       Problem: Skin Injury Risk (Adult)  Goal: Identify Related Risk Factors and Signs and Symptoms  Outcome: Ongoing (interventions implemented as appropriate)   06/17/18 1411   Skin Injury Risk (Adult)   Related Risk Factors (Skin Injury Risk) mobility impaired;infection;moisture         
Problem: Urinary Tract Infection (Adult)  Goal: Signs and Symptoms of Listed Potential Problems Will be Absent, Minimized or Managed (Urinary Tract Infection)  Outcome: Ongoing (interventions implemented as appropriate)      Problem: Infection, Risk/Actual (Adult)  Goal: Identify Related Risk Factors and Signs and Symptoms  Outcome: Ongoing (interventions implemented as appropriate)    Goal: Infection Prevention/Resolution  Outcome: Ongoing (interventions implemented as appropriate)      Problem: Wound (Includes Pressure Injury) (Adult)  Goal: Signs and Symptoms of Listed Potential Problems Will be Absent, Minimized or Managed (Wound)  Outcome: Ongoing (interventions implemented as appropriate)      Problem: Activity Intolerance (Adult)  Goal: Identify Related Risk Factors and Signs and Symptoms  Outcome: Ongoing (interventions implemented as appropriate)    Goal: Activity Tolerance  Outcome: Ongoing (interventions implemented as appropriate)    Goal: Effective Energy Conservation Techniques  Outcome: Ongoing (interventions implemented as appropriate)      Problem: Mobility, Physical Impaired (Adult)  Goal: Identify Related Risk Factors and Signs and Symptoms  Outcome: Ongoing (interventions implemented as appropriate)    Goal: Enhanced Mobility Skills  Outcome: Ongoing (interventions implemented as appropriate)    Goal: Enhanced Functional Ability  Outcome: Ongoing (interventions implemented as appropriate)        
pt/yes

## 2022-03-09 ENCOUNTER — APPOINTMENT (OUTPATIENT)
Dept: GENERAL RADIOLOGY | Facility: HOSPITAL | Age: 32
End: 2022-03-09

## 2022-03-09 ENCOUNTER — HOSPITAL ENCOUNTER (EMERGENCY)
Facility: HOSPITAL | Age: 32
Discharge: HOME | End: 2022-03-09
Attending: STUDENT IN AN ORGANIZED HEALTH CARE EDUCATION/TRAINING PROGRAM | Admitting: STUDENT IN AN ORGANIZED HEALTH CARE EDUCATION/TRAINING PROGRAM

## 2022-03-09 ENCOUNTER — APPOINTMENT (OUTPATIENT)
Dept: CT IMAGING | Facility: HOSPITAL | Age: 32
End: 2022-03-09

## 2022-03-09 VITALS
SYSTOLIC BLOOD PRESSURE: 92 MMHG | TEMPERATURE: 98 F | OXYGEN SATURATION: 91 % | RESPIRATION RATE: 18 BRPM | DIASTOLIC BLOOD PRESSURE: 56 MMHG | BODY MASS INDEX: 19.7 KG/M2 | HEART RATE: 68 BPM | WEIGHT: 130 LBS | HEIGHT: 68 IN

## 2022-03-09 DIAGNOSIS — N30.01 ACUTE CYSTITIS WITH HEMATURIA: ICD-10-CM

## 2022-03-09 DIAGNOSIS — K59.00 CONSTIPATION, UNSPECIFIED CONSTIPATION TYPE: Primary | ICD-10-CM

## 2022-03-09 LAB
ALBUMIN SERPL-MCNC: 3.13 G/DL (ref 3.5–5.2)
ALBUMIN/GLOB SERPL: 0.9 G/DL
ALP SERPL-CCNC: 82 U/L (ref 39–117)
ALT SERPL W P-5'-P-CCNC: 6 U/L (ref 1–33)
ANION GAP SERPL CALCULATED.3IONS-SCNC: 9.5 MMOL/L (ref 5–15)
AST SERPL-CCNC: 11 U/L (ref 1–32)
B-HCG UR QL: NEGATIVE
BACTERIA UR QL AUTO: ABNORMAL /HPF
BASOPHILS # BLD AUTO: 0.06 10*3/MM3 (ref 0–0.2)
BASOPHILS NFR BLD AUTO: 0.7 % (ref 0–1.5)
BILIRUB SERPL-MCNC: 0.3 MG/DL (ref 0–1.2)
BILIRUB UR QL STRIP: ABNORMAL
BUN SERPL-MCNC: 22 MG/DL (ref 6–20)
BUN/CREAT SERPL: 13.7 (ref 7–25)
CALCIUM SPEC-SCNC: 8 MG/DL (ref 8.6–10.5)
CHLORIDE SERPL-SCNC: 105 MMOL/L (ref 98–107)
CLARITY UR: ABNORMAL
CO2 SERPL-SCNC: 18.5 MMOL/L (ref 22–29)
COLOR UR: ABNORMAL
CREAT SERPL-MCNC: 1.61 MG/DL (ref 0.57–1)
CRP SERPL-MCNC: 2.47 MG/DL (ref 0–0.5)
DEPRECATED RDW RBC AUTO: 56 FL (ref 37–54)
EGFRCR SERPLBLD CKD-EPI 2021: 44 ML/MIN/1.73
EOSINOPHIL # BLD AUTO: 0.51 10*3/MM3 (ref 0–0.4)
EOSINOPHIL NFR BLD AUTO: 5.9 % (ref 0.3–6.2)
ERYTHROCYTE [DISTWIDTH] IN BLOOD BY AUTOMATED COUNT: 15.9 % (ref 12.3–15.4)
GLOBULIN UR ELPH-MCNC: 3.6 GM/DL
GLUCOSE SERPL-MCNC: 101 MG/DL (ref 65–99)
GLUCOSE UR STRIP-MCNC: NEGATIVE MG/DL
HCT VFR BLD AUTO: 34.1 % (ref 34–46.6)
HGB BLD-MCNC: 10.8 G/DL (ref 12–15.9)
HGB UR QL STRIP.AUTO: ABNORMAL
HYALINE CASTS UR QL AUTO: ABNORMAL /LPF
IMM GRANULOCYTES # BLD AUTO: 0.03 10*3/MM3 (ref 0–0.05)
IMM GRANULOCYTES NFR BLD AUTO: 0.3 % (ref 0–0.5)
KETONES UR QL STRIP: NEGATIVE
LEUKOCYTE ESTERASE UR QL STRIP.AUTO: ABNORMAL
LIPASE SERPL-CCNC: 22 U/L (ref 13–60)
LYMPHOCYTES # BLD AUTO: 1.53 10*3/MM3 (ref 0.7–3.1)
LYMPHOCYTES NFR BLD AUTO: 17.6 % (ref 19.6–45.3)
MCH RBC QN AUTO: 30.5 PG (ref 26.6–33)
MCHC RBC AUTO-ENTMCNC: 31.7 G/DL (ref 31.5–35.7)
MCV RBC AUTO: 96.3 FL (ref 79–97)
MONOCYTES # BLD AUTO: 0.56 10*3/MM3 (ref 0.1–0.9)
MONOCYTES NFR BLD AUTO: 6.5 % (ref 5–12)
NEUTROPHILS NFR BLD AUTO: 5.99 10*3/MM3 (ref 1.7–7)
NEUTROPHILS NFR BLD AUTO: 69 % (ref 42.7–76)
NITRITE UR QL STRIP: POSITIVE
NRBC BLD AUTO-RTO: 0 /100 WBC (ref 0–0.2)
PH UR STRIP.AUTO: 6.5 [PH] (ref 5–8)
PLATELET # BLD AUTO: 232 10*3/MM3 (ref 140–450)
PMV BLD AUTO: 9.7 FL (ref 6–12)
POTASSIUM SERPL-SCNC: 4.1 MMOL/L (ref 3.5–5.2)
PROT SERPL-MCNC: 6.7 G/DL (ref 6–8.5)
PROT UR QL STRIP: ABNORMAL
RBC # BLD AUTO: 3.54 10*6/MM3 (ref 3.77–5.28)
RBC # UR STRIP: ABNORMAL /HPF
REF LAB TEST METHOD: ABNORMAL
SODIUM SERPL-SCNC: 133 MMOL/L (ref 136–145)
SP GR UR STRIP: 1.01 (ref 1–1.03)
SQUAMOUS #/AREA URNS HPF: ABNORMAL /HPF
UROBILINOGEN UR QL STRIP: ABNORMAL
WBC # UR STRIP: ABNORMAL /HPF
WBC NRBC COR # BLD: 8.68 10*3/MM3 (ref 3.4–10.8)

## 2022-03-09 PROCEDURE — 96365 THER/PROPH/DIAG IV INF INIT: CPT

## 2022-03-09 PROCEDURE — 83690 ASSAY OF LIPASE: CPT | Performed by: PHYSICIAN ASSISTANT

## 2022-03-09 PROCEDURE — 81001 URINALYSIS AUTO W/SCOPE: CPT | Performed by: PHYSICIAN ASSISTANT

## 2022-03-09 PROCEDURE — 86140 C-REACTIVE PROTEIN: CPT | Performed by: PHYSICIAN ASSISTANT

## 2022-03-09 PROCEDURE — 96375 TX/PRO/DX INJ NEW DRUG ADDON: CPT

## 2022-03-09 PROCEDURE — 74018 RADEX ABDOMEN 1 VIEW: CPT | Performed by: RADIOLOGY

## 2022-03-09 PROCEDURE — 74176 CT ABD & PELVIS W/O CONTRAST: CPT

## 2022-03-09 PROCEDURE — 99283 EMERGENCY DEPT VISIT LOW MDM: CPT

## 2022-03-09 PROCEDURE — 85025 COMPLETE CBC W/AUTO DIFF WBC: CPT | Performed by: PHYSICIAN ASSISTANT

## 2022-03-09 PROCEDURE — 74176 CT ABD & PELVIS W/O CONTRAST: CPT | Performed by: RADIOLOGY

## 2022-03-09 PROCEDURE — 25010000002 ONDANSETRON PER 1 MG: Performed by: PHYSICIAN ASSISTANT

## 2022-03-09 PROCEDURE — P9612 CATHETERIZE FOR URINE SPEC: HCPCS

## 2022-03-09 PROCEDURE — 25010000002 ORPHENADRINE CITRATE PER 60 MG: Performed by: PHYSICIAN ASSISTANT

## 2022-03-09 PROCEDURE — 74018 RADEX ABDOMEN 1 VIEW: CPT

## 2022-03-09 PROCEDURE — 81025 URINE PREGNANCY TEST: CPT | Performed by: PHYSICIAN ASSISTANT

## 2022-03-09 PROCEDURE — 80053 COMPREHEN METABOLIC PANEL: CPT | Performed by: PHYSICIAN ASSISTANT

## 2022-03-09 RX ORDER — SODIUM CHLORIDE 0.9 % (FLUSH) 0.9 %
10 SYRINGE (ML) INJECTION AS NEEDED
Status: DISCONTINUED | OUTPATIENT
Start: 2022-03-09 | End: 2022-03-09 | Stop reason: HOSPADM

## 2022-03-09 RX ORDER — MAGNESIUM CARB/ALUMINUM HYDROX 105-160MG
296 TABLET,CHEWABLE ORAL ONCE
Status: COMPLETED | OUTPATIENT
Start: 2022-03-09 | End: 2022-03-09

## 2022-03-09 RX ORDER — ORPHENADRINE CITRATE 30 MG/ML
60 INJECTION INTRAMUSCULAR; INTRAVENOUS ONCE
Status: COMPLETED | OUTPATIENT
Start: 2022-03-09 | End: 2022-03-09

## 2022-03-09 RX ORDER — ONDANSETRON 2 MG/ML
4 INJECTION INTRAMUSCULAR; INTRAVENOUS ONCE
Status: COMPLETED | OUTPATIENT
Start: 2022-03-09 | End: 2022-03-09

## 2022-03-09 RX ADMIN — ORPHENADRINE CITRATE 60 MG: 30 INJECTION INTRAMUSCULAR; INTRAVENOUS at 13:12

## 2022-03-09 RX ADMIN — ONDANSETRON 4 MG: 2 INJECTION INTRAMUSCULAR; INTRAVENOUS at 15:12

## 2022-03-09 RX ADMIN — CITROMA MAGNESIUM CITRATE 296 ML: 1.75 LIQUID ORAL at 14:29

## 2022-03-09 RX ADMIN — SODIUM CHLORIDE 500 ML: 9 INJECTION, SOLUTION INTRAVENOUS at 12:58

## 2022-03-09 RX ADMIN — SODIUM CHLORIDE 2 G: 9 INJECTION, SOLUTION INTRAVENOUS at 13:43

## 2022-03-09 NOTE — ED NOTES
Disimpacted patient per PA Apnda orders. Moderate amount of stool retrieved, pt. Reports abdominal discomfort relief.
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
## 2022-03-09 NOTE — DISCHARGE INSTRUCTIONS
Please increase your fluids and take your antibiotics. Please follow up with your PCP in 2 days or return to ER if symptoms worsen.

## 2022-03-12 ENCOUNTER — APPOINTMENT (OUTPATIENT)
Dept: ULTRASOUND IMAGING | Facility: HOSPITAL | Age: 32
End: 2022-03-12

## 2022-03-12 ENCOUNTER — APPOINTMENT (OUTPATIENT)
Dept: CT IMAGING | Facility: HOSPITAL | Age: 32
End: 2022-03-12

## 2022-03-12 ENCOUNTER — HOSPITAL ENCOUNTER (OUTPATIENT)
Facility: HOSPITAL | Age: 32
Setting detail: OBSERVATION
Discharge: HOME HEALTH | End: 2022-03-16
Attending: EMERGENCY MEDICINE | Admitting: HOSPITALIST

## 2022-03-12 DIAGNOSIS — R14.0 ABDOMINAL DISTENTION: Primary | ICD-10-CM

## 2022-03-12 DIAGNOSIS — L89.159 PRESSURE INJURY OF SKIN OF SACRAL REGION, UNSPECIFIED INJURY STAGE: ICD-10-CM

## 2022-03-12 LAB
ALBUMIN SERPL-MCNC: 3.41 G/DL (ref 3.5–5.2)
ALBUMIN/GLOB SERPL: 0.9 G/DL
ALP SERPL-CCNC: 78 U/L (ref 39–117)
ALT SERPL W P-5'-P-CCNC: 9 U/L (ref 1–33)
ANION GAP SERPL CALCULATED.3IONS-SCNC: 10.2 MMOL/L (ref 5–15)
AST SERPL-CCNC: 16 U/L (ref 1–32)
BACTERIA UR QL AUTO: ABNORMAL /HPF
BASOPHILS # BLD AUTO: 0.05 10*3/MM3 (ref 0–0.2)
BASOPHILS NFR BLD AUTO: 0.6 % (ref 0–1.5)
BILIRUB SERPL-MCNC: 0.3 MG/DL (ref 0–1.2)
BILIRUB UR QL STRIP: ABNORMAL
BUN SERPL-MCNC: 18 MG/DL (ref 6–20)
BUN/CREAT SERPL: 13.1 (ref 7–25)
CALCIUM SPEC-SCNC: 8.3 MG/DL (ref 8.6–10.5)
CHLORIDE SERPL-SCNC: 101 MMOL/L (ref 98–107)
CLARITY UR: ABNORMAL
CO2 SERPL-SCNC: 23.8 MMOL/L (ref 22–29)
COLOR UR: ABNORMAL
CREAT SERPL-MCNC: 1.37 MG/DL (ref 0.57–1)
CRP SERPL-MCNC: 0.45 MG/DL (ref 0–0.5)
DEPRECATED RDW RBC AUTO: 56.3 FL (ref 37–54)
EGFRCR SERPLBLD CKD-EPI 2021: 53.4 ML/MIN/1.73
EOSINOPHIL # BLD AUTO: 0.14 10*3/MM3 (ref 0–0.4)
EOSINOPHIL NFR BLD AUTO: 1.6 % (ref 0.3–6.2)
ERYTHROCYTE [DISTWIDTH] IN BLOOD BY AUTOMATED COUNT: 16.1 % (ref 12.3–15.4)
FLUAV SUBTYP SPEC NAA+PROBE: NOT DETECTED
FLUBV RNA ISLT QL NAA+PROBE: NOT DETECTED
GLOBULIN UR ELPH-MCNC: 3.7 GM/DL
GLUCOSE SERPL-MCNC: 98 MG/DL (ref 65–99)
GLUCOSE UR STRIP-MCNC: NEGATIVE MG/DL
HCG SERPL QL: NEGATIVE
HCT VFR BLD AUTO: 36.7 % (ref 34–46.6)
HGB BLD-MCNC: 11.5 G/DL (ref 12–15.9)
HGB UR QL STRIP.AUTO: ABNORMAL
HOLD SPECIMEN: NORMAL
HOLD SPECIMEN: NORMAL
HYALINE CASTS UR QL AUTO: ABNORMAL /LPF
IMM GRANULOCYTES # BLD AUTO: 0.06 10*3/MM3 (ref 0–0.05)
IMM GRANULOCYTES NFR BLD AUTO: 0.7 % (ref 0–0.5)
KETONES UR QL STRIP: NEGATIVE
LEUKOCYTE ESTERASE UR QL STRIP.AUTO: ABNORMAL
LIPASE SERPL-CCNC: 17 U/L (ref 13–60)
LYMPHOCYTES # BLD AUTO: 1.85 10*3/MM3 (ref 0.7–3.1)
LYMPHOCYTES NFR BLD AUTO: 20.8 % (ref 19.6–45.3)
MCH RBC QN AUTO: 30.5 PG (ref 26.6–33)
MCHC RBC AUTO-ENTMCNC: 31.3 G/DL (ref 31.5–35.7)
MCV RBC AUTO: 97.3 FL (ref 79–97)
MONOCYTES # BLD AUTO: 0.67 10*3/MM3 (ref 0.1–0.9)
MONOCYTES NFR BLD AUTO: 7.5 % (ref 5–12)
NEUTROPHILS NFR BLD AUTO: 6.14 10*3/MM3 (ref 1.7–7)
NEUTROPHILS NFR BLD AUTO: 68.8 % (ref 42.7–76)
NITRITE UR QL STRIP: POSITIVE
NRBC BLD AUTO-RTO: 0 /100 WBC (ref 0–0.2)
PH UR STRIP.AUTO: 7.5 [PH] (ref 5–8)
PLATELET # BLD AUTO: 244 10*3/MM3 (ref 140–450)
PMV BLD AUTO: 9.7 FL (ref 6–12)
POTASSIUM SERPL-SCNC: 4.1 MMOL/L (ref 3.5–5.2)
PROT SERPL-MCNC: 7.1 G/DL (ref 6–8.5)
PROT UR QL STRIP: NEGATIVE
RBC # BLD AUTO: 3.77 10*6/MM3 (ref 3.77–5.28)
RBC # UR STRIP: ABNORMAL /HPF
REF LAB TEST METHOD: ABNORMAL
SARS-COV-2 RNA PNL SPEC NAA+PROBE: NOT DETECTED
SODIUM SERPL-SCNC: 135 MMOL/L (ref 136–145)
SP GR UR STRIP: 1.01 (ref 1–1.03)
SQUAMOUS #/AREA URNS HPF: ABNORMAL /HPF
UROBILINOGEN UR QL STRIP: ABNORMAL
WBC # UR STRIP: ABNORMAL /HPF
WBC NRBC COR # BLD: 8.91 10*3/MM3 (ref 3.4–10.8)
WHOLE BLOOD HOLD SPECIMEN: NORMAL
WHOLE BLOOD HOLD SPECIMEN: NORMAL

## 2022-03-12 PROCEDURE — 84703 CHORIONIC GONADOTROPIN ASSAY: CPT | Performed by: EMERGENCY MEDICINE

## 2022-03-12 PROCEDURE — 86140 C-REACTIVE PROTEIN: CPT | Performed by: EMERGENCY MEDICINE

## 2022-03-12 PROCEDURE — 87636 SARSCOV2 & INF A&B AMP PRB: CPT | Performed by: EMERGENCY MEDICINE

## 2022-03-12 PROCEDURE — 82607 VITAMIN B-12: CPT | Performed by: INTERNAL MEDICINE

## 2022-03-12 PROCEDURE — 76705 ECHO EXAM OF ABDOMEN: CPT

## 2022-03-12 PROCEDURE — G0378 HOSPITAL OBSERVATION PER HR: HCPCS

## 2022-03-12 PROCEDURE — 80053 COMPREHEN METABOLIC PANEL: CPT | Performed by: EMERGENCY MEDICINE

## 2022-03-12 PROCEDURE — 85025 COMPLETE CBC W/AUTO DIFF WBC: CPT | Performed by: EMERGENCY MEDICINE

## 2022-03-12 PROCEDURE — 74176 CT ABD & PELVIS W/O CONTRAST: CPT

## 2022-03-12 PROCEDURE — 82746 ASSAY OF FOLIC ACID SERUM: CPT | Performed by: INTERNAL MEDICINE

## 2022-03-12 PROCEDURE — P9612 CATHETERIZE FOR URINE SPEC: HCPCS

## 2022-03-12 PROCEDURE — C9803 HOPD COVID-19 SPEC COLLECT: HCPCS

## 2022-03-12 PROCEDURE — 87086 URINE CULTURE/COLONY COUNT: CPT | Performed by: EMERGENCY MEDICINE

## 2022-03-12 PROCEDURE — 83690 ASSAY OF LIPASE: CPT | Performed by: EMERGENCY MEDICINE

## 2022-03-12 PROCEDURE — 83036 HEMOGLOBIN GLYCOSYLATED A1C: CPT | Performed by: INTERNAL MEDICINE

## 2022-03-12 PROCEDURE — 84443 ASSAY THYROID STIM HORMONE: CPT | Performed by: INTERNAL MEDICINE

## 2022-03-12 PROCEDURE — 99284 EMERGENCY DEPT VISIT MOD MDM: CPT

## 2022-03-12 PROCEDURE — 96365 THER/PROPH/DIAG IV INF INIT: CPT

## 2022-03-12 PROCEDURE — 81001 URINALYSIS AUTO W/SCOPE: CPT | Performed by: EMERGENCY MEDICINE

## 2022-03-12 RX ORDER — SODIUM CHLORIDE 0.9 % (FLUSH) 0.9 %
10 SYRINGE (ML) INJECTION AS NEEDED
Status: DISCONTINUED | OUTPATIENT
Start: 2022-03-12 | End: 2022-03-16 | Stop reason: HOSPADM

## 2022-03-12 RX ADMIN — SODIUM CHLORIDE 1000 ML: 9 INJECTION, SOLUTION INTRAVENOUS at 20:59

## 2022-03-12 RX ADMIN — SODIUM CHLORIDE 2 G: 9 INJECTION, SOLUTION INTRAVENOUS at 21:01

## 2022-03-12 NOTE — ED NOTES
Faxed medical record release form to [facility] for most recent UA with culture. Requested by Dr. Trejo.
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
## 2022-03-13 ENCOUNTER — APPOINTMENT (OUTPATIENT)
Dept: GENERAL RADIOLOGY | Facility: HOSPITAL | Age: 32
End: 2022-03-13

## 2022-03-13 LAB
ALBUMIN SERPL-MCNC: 3.33 G/DL (ref 3.5–5.2)
ALBUMIN/GLOB SERPL: 0.9 G/DL
ALP SERPL-CCNC: 80 U/L (ref 39–117)
ALT SERPL W P-5'-P-CCNC: 8 U/L (ref 1–33)
ANION GAP SERPL CALCULATED.3IONS-SCNC: 9.9 MMOL/L (ref 5–15)
AST SERPL-CCNC: 16 U/L (ref 1–32)
BACTERIA SPEC AEROBE CULT: NORMAL
BASOPHILS # BLD AUTO: 0.06 10*3/MM3 (ref 0–0.2)
BASOPHILS NFR BLD AUTO: 0.9 % (ref 0–1.5)
BILIRUB SERPL-MCNC: 0.3 MG/DL (ref 0–1.2)
BUN SERPL-MCNC: 19 MG/DL (ref 6–20)
BUN/CREAT SERPL: 13.2 (ref 7–25)
CALCIUM SPEC-SCNC: 8 MG/DL (ref 8.6–10.5)
CHLORIDE SERPL-SCNC: 103 MMOL/L (ref 98–107)
CO2 SERPL-SCNC: 22.1 MMOL/L (ref 22–29)
CREAT SERPL-MCNC: 1.44 MG/DL (ref 0.57–1)
DEPRECATED RDW RBC AUTO: 58.9 FL (ref 37–54)
EGFRCR SERPLBLD CKD-EPI 2021: 50.3 ML/MIN/1.73
EOSINOPHIL # BLD AUTO: 0.4 10*3/MM3 (ref 0–0.4)
EOSINOPHIL NFR BLD AUTO: 5.7 % (ref 0.3–6.2)
ERYTHROCYTE [DISTWIDTH] IN BLOOD BY AUTOMATED COUNT: 16.5 % (ref 12.3–15.4)
FOLATE SERPL-MCNC: 6.24 NG/ML (ref 4.78–24.2)
GLOBULIN UR ELPH-MCNC: 3.7 GM/DL
GLUCOSE SERPL-MCNC: 101 MG/DL (ref 65–99)
HBA1C MFR BLD: 4.8 % (ref 4.8–5.6)
HCT VFR BLD AUTO: 38.3 % (ref 34–46.6)
HGB BLD-MCNC: 11.7 G/DL (ref 12–15.9)
IMM GRANULOCYTES # BLD AUTO: 0.04 10*3/MM3 (ref 0–0.05)
IMM GRANULOCYTES NFR BLD AUTO: 0.6 % (ref 0–0.5)
LYMPHOCYTES # BLD AUTO: 1.68 10*3/MM3 (ref 0.7–3.1)
LYMPHOCYTES NFR BLD AUTO: 23.9 % (ref 19.6–45.3)
MCH RBC QN AUTO: 30.8 PG (ref 26.6–33)
MCHC RBC AUTO-ENTMCNC: 30.5 G/DL (ref 31.5–35.7)
MCV RBC AUTO: 100.8 FL (ref 79–97)
MONOCYTES # BLD AUTO: 0.73 10*3/MM3 (ref 0.1–0.9)
MONOCYTES NFR BLD AUTO: 10.4 % (ref 5–12)
NEUTROPHILS NFR BLD AUTO: 4.13 10*3/MM3 (ref 1.7–7)
NEUTROPHILS NFR BLD AUTO: 58.5 % (ref 42.7–76)
NRBC BLD AUTO-RTO: 0 /100 WBC (ref 0–0.2)
PLATELET # BLD AUTO: 232 10*3/MM3 (ref 140–450)
PMV BLD AUTO: 9.9 FL (ref 6–12)
POTASSIUM SERPL-SCNC: 4.2 MMOL/L (ref 3.5–5.2)
PROT SERPL-MCNC: 7 G/DL (ref 6–8.5)
RBC # BLD AUTO: 3.8 10*6/MM3 (ref 3.77–5.28)
SODIUM SERPL-SCNC: 135 MMOL/L (ref 136–145)
TSH SERPL DL<=0.05 MIU/L-ACNC: 3.58 UIU/ML (ref 0.27–4.2)
VIT B12 BLD-MCNC: 243 PG/ML (ref 211–946)
WBC NRBC COR # BLD: 7.04 10*3/MM3 (ref 3.4–10.8)

## 2022-03-13 PROCEDURE — G0378 HOSPITAL OBSERVATION PER HR: HCPCS

## 2022-03-13 PROCEDURE — 80053 COMPREHEN METABOLIC PANEL: CPT | Performed by: INTERNAL MEDICINE

## 2022-03-13 PROCEDURE — 85025 COMPLETE CBC W/AUTO DIFF WBC: CPT | Performed by: INTERNAL MEDICINE

## 2022-03-13 PROCEDURE — 25010000002 HEPARIN (PORCINE) PER 1000 UNITS: Performed by: INTERNAL MEDICINE

## 2022-03-13 PROCEDURE — 96372 THER/PROPH/DIAG INJ SC/IM: CPT

## 2022-03-13 PROCEDURE — 74018 RADEX ABDOMEN 1 VIEW: CPT

## 2022-03-13 PROCEDURE — 99220 PR INITIAL OBSERVATION CARE/DAY 70 MINUTES: CPT | Performed by: INTERNAL MEDICINE

## 2022-03-13 PROCEDURE — 99202 OFFICE O/P NEW SF 15 MIN: CPT | Performed by: SURGERY

## 2022-03-13 RX ORDER — ALBUTEROL SULFATE 90 UG/1
2 AEROSOL, METERED RESPIRATORY (INHALATION) EVERY 6 HOURS PRN
Status: CANCELLED | OUTPATIENT
Start: 2022-03-13

## 2022-03-13 RX ORDER — DICYCLOMINE HYDROCHLORIDE 10 MG/1
10 CAPSULE ORAL 3 TIMES DAILY PRN
Status: CANCELLED | OUTPATIENT
Start: 2022-03-13

## 2022-03-13 RX ORDER — PANTOPRAZOLE SODIUM 40 MG/1
40 TABLET, DELAYED RELEASE ORAL
Status: DISCONTINUED | OUTPATIENT
Start: 2022-03-13 | End: 2022-03-16 | Stop reason: HOSPADM

## 2022-03-13 RX ORDER — PHENAZOPYRIDINE HYDROCHLORIDE 200 MG/1
200 TABLET, FILM COATED ORAL 3 TIMES DAILY PRN
Status: CANCELLED | OUTPATIENT
Start: 2022-03-13

## 2022-03-13 RX ORDER — CHOLECALCIFEROL (VITAMIN D3) 125 MCG
10 CAPSULE ORAL NIGHTLY PRN
Status: DISCONTINUED | OUTPATIENT
Start: 2022-03-13 | End: 2022-03-16 | Stop reason: HOSPADM

## 2022-03-13 RX ORDER — HEPARIN SODIUM 5000 [USP'U]/ML
5000 INJECTION, SOLUTION INTRAVENOUS; SUBCUTANEOUS EVERY 12 HOURS SCHEDULED
Status: DISCONTINUED | OUTPATIENT
Start: 2022-03-13 | End: 2022-03-16 | Stop reason: HOSPADM

## 2022-03-13 RX ORDER — DICYCLOMINE HYDROCHLORIDE 10 MG/1
10 CAPSULE ORAL 3 TIMES DAILY PRN
COMMUNITY

## 2022-03-13 RX ORDER — NITROFURANTOIN 25; 75 MG/1; MG/1
100 CAPSULE ORAL 2 TIMES DAILY
Status: CANCELLED | OUTPATIENT
Start: 2022-03-13 | End: 2022-03-16

## 2022-03-13 RX ORDER — SODIUM CHLORIDE 0.9 % (FLUSH) 0.9 %
10 SYRINGE (ML) INJECTION EVERY 12 HOURS SCHEDULED
Status: DISCONTINUED | OUTPATIENT
Start: 2022-03-13 | End: 2022-03-16 | Stop reason: HOSPADM

## 2022-03-13 RX ORDER — SODIUM CHLORIDE 0.9 % (FLUSH) 0.9 %
10 SYRINGE (ML) INJECTION AS NEEDED
Status: DISCONTINUED | OUTPATIENT
Start: 2022-03-13 | End: 2022-03-16 | Stop reason: HOSPADM

## 2022-03-13 RX ORDER — ACETAMINOPHEN 325 MG/1
650 TABLET ORAL EVERY 6 HOURS PRN
Status: DISCONTINUED | OUTPATIENT
Start: 2022-03-13 | End: 2022-03-16 | Stop reason: HOSPADM

## 2022-03-13 RX ORDER — AMOXICILLIN 250 MG
2 CAPSULE ORAL 2 TIMES DAILY
Status: DISCONTINUED | OUTPATIENT
Start: 2022-03-13 | End: 2022-03-16 | Stop reason: HOSPADM

## 2022-03-13 RX ORDER — NITROFURANTOIN 25; 75 MG/1; MG/1
100 CAPSULE ORAL 2 TIMES DAILY
Status: CANCELLED | OUTPATIENT
Start: 2022-03-13 | End: 2022-03-17

## 2022-03-13 RX ORDER — HYDROXYZINE HYDROCHLORIDE 25 MG/1
25 TABLET, FILM COATED ORAL 3 TIMES DAILY PRN
Status: DISCONTINUED | OUTPATIENT
Start: 2022-03-13 | End: 2022-03-16 | Stop reason: HOSPADM

## 2022-03-13 RX ORDER — BISACODYL 5 MG/1
5 TABLET, DELAYED RELEASE ORAL DAILY PRN
Status: DISCONTINUED | OUTPATIENT
Start: 2022-03-13 | End: 2022-03-16 | Stop reason: HOSPADM

## 2022-03-13 RX ORDER — BISACODYL 10 MG
10 SUPPOSITORY, RECTAL RECTAL DAILY PRN
Status: DISCONTINUED | OUTPATIENT
Start: 2022-03-13 | End: 2022-03-16 | Stop reason: HOSPADM

## 2022-03-13 RX ORDER — NITROFURANTOIN 25; 75 MG/1; MG/1
100 CAPSULE ORAL 2 TIMES DAILY
COMMUNITY
End: 2022-03-16 | Stop reason: HOSPADM

## 2022-03-13 RX ORDER — CALCIUM CARBONATE 200(500)MG
2 TABLET,CHEWABLE ORAL 3 TIMES DAILY PRN
Status: DISCONTINUED | OUTPATIENT
Start: 2022-03-13 | End: 2022-03-16 | Stop reason: HOSPADM

## 2022-03-13 RX ORDER — ALBUTEROL SULFATE 2.5 MG/3ML
2.5 SOLUTION RESPIRATORY (INHALATION) EVERY 6 HOURS PRN
Status: CANCELLED | OUTPATIENT
Start: 2022-03-13

## 2022-03-13 RX ORDER — SIMETHICONE 80 MG
80 TABLET,CHEWABLE ORAL
Status: DISCONTINUED | OUTPATIENT
Start: 2022-03-13 | End: 2022-03-16 | Stop reason: HOSPADM

## 2022-03-13 RX ORDER — ALBUTEROL SULFATE 90 UG/1
2 AEROSOL, METERED RESPIRATORY (INHALATION) EVERY 6 HOURS PRN
COMMUNITY

## 2022-03-13 RX ORDER — POLYETHYLENE GLYCOL 3350 17 G/17G
17 POWDER, FOR SOLUTION ORAL DAILY
Status: DISCONTINUED | OUTPATIENT
Start: 2022-03-13 | End: 2022-03-16 | Stop reason: HOSPADM

## 2022-03-13 RX ORDER — POLYETHYLENE GLYCOL 3350 17 G/17G
17 POWDER, FOR SOLUTION ORAL DAILY PRN
Status: DISCONTINUED | OUTPATIENT
Start: 2022-03-13 | End: 2022-03-16 | Stop reason: HOSPADM

## 2022-03-13 RX ORDER — SODIUM CHLORIDE 9 MG/ML
30 INJECTION, SOLUTION INTRAVENOUS CONTINUOUS
Status: DISCONTINUED | OUTPATIENT
Start: 2022-03-13 | End: 2022-03-15

## 2022-03-13 RX ORDER — SIMETHICONE 80 MG
80 TABLET,CHEWABLE ORAL 4 TIMES DAILY PRN
Status: DISCONTINUED | OUTPATIENT
Start: 2022-03-13 | End: 2022-03-13

## 2022-03-13 RX ORDER — PHENAZOPYRIDINE HYDROCHLORIDE 200 MG/1
200 TABLET, FILM COATED ORAL 3 TIMES DAILY PRN
COMMUNITY

## 2022-03-13 RX ADMIN — DOCUSATE SODIUM 50 MG AND SENNOSIDES 8.6 MG 2 TABLET: 8.6; 5 TABLET, FILM COATED ORAL at 20:29

## 2022-03-13 RX ADMIN — SIMETHICONE 80 MG: 80 TABLET, CHEWABLE ORAL at 08:42

## 2022-03-13 RX ADMIN — Medication 10 ML: at 20:29

## 2022-03-13 RX ADMIN — HEPARIN SODIUM 5000 UNITS: 5000 INJECTION INTRAVENOUS; SUBCUTANEOUS at 08:42

## 2022-03-13 RX ADMIN — SIMETHICONE 80 MG: 80 TABLET, CHEWABLE ORAL at 20:29

## 2022-03-13 RX ADMIN — SODIUM CHLORIDE 2 G: 9 INJECTION, SOLUTION INTRAVENOUS at 13:01

## 2022-03-13 RX ADMIN — SIMETHICONE 80 MG: 80 TABLET, CHEWABLE ORAL at 13:01

## 2022-03-13 RX ADMIN — Medication 10 ML: at 08:42

## 2022-03-13 RX ADMIN — HEPARIN SODIUM 5000 UNITS: 5000 INJECTION INTRAVENOUS; SUBCUTANEOUS at 20:29

## 2022-03-13 RX ADMIN — SODIUM CHLORIDE 75 ML/HR: 9 INJECTION, SOLUTION INTRAVENOUS at 04:58

## 2022-03-13 RX ADMIN — SODIUM CHLORIDE 2 G: 9 INJECTION, SOLUTION INTRAVENOUS at 20:28

## 2022-03-13 RX ADMIN — SODIUM CHLORIDE 2 G: 9 INJECTION, SOLUTION INTRAVENOUS at 04:59

## 2022-03-13 RX ADMIN — SIMETHICONE 80 MG: 80 TABLET, CHEWABLE ORAL at 17:33

## 2022-03-13 RX ADMIN — PANTOPRAZOLE SODIUM 40 MG: 40 TABLET, DELAYED RELEASE ORAL at 06:57
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
## 2022-03-13 NOTE — PLAN OF CARE
Patient arrived on the unit during this shift. Some s/s of abdomen discomfort. MIRTHA GAMEZ aware. No complains. Will continue to monitor and follow care plan.

## 2022-03-13 NOTE — PLAN OF CARE
Goal Outcome Evaluation: Patient has been very pleasant today. Compliant with all medications and care as ordered. She remains on room air, saturations maintaining well above 90%. NS remains infusing at 75 mL/hr. Spouse remains at bedside, assistive with patient care. She had 2 Large BM during shift, held PM Miralax. She is currently being fed dinner. Will continue with plan of care.
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
## 2022-03-14 PROCEDURE — 96366 THER/PROPH/DIAG IV INF ADDON: CPT

## 2022-03-14 PROCEDURE — G0378 HOSPITAL OBSERVATION PER HR: HCPCS

## 2022-03-14 PROCEDURE — 25010000002 HEPARIN (PORCINE) PER 1000 UNITS: Performed by: INTERNAL MEDICINE

## 2022-03-14 PROCEDURE — 96361 HYDRATE IV INFUSION ADD-ON: CPT

## 2022-03-14 PROCEDURE — 51798 US URINE CAPACITY MEASURE: CPT

## 2022-03-14 PROCEDURE — 99225 PR SBSQ OBSERVATION CARE/DAY 25 MINUTES: CPT | Performed by: HOSPITALIST

## 2022-03-14 PROCEDURE — 96372 THER/PROPH/DIAG INJ SC/IM: CPT

## 2022-03-14 RX ADMIN — SIMETHICONE 80 MG: 80 TABLET, CHEWABLE ORAL at 17:58

## 2022-03-14 RX ADMIN — SIMETHICONE 80 MG: 80 TABLET, CHEWABLE ORAL at 05:18

## 2022-03-14 RX ADMIN — SODIUM CHLORIDE 75 ML/HR: 9 INJECTION, SOLUTION INTRAVENOUS at 12:27

## 2022-03-14 RX ADMIN — SIMETHICONE 80 MG: 80 TABLET, CHEWABLE ORAL at 12:26

## 2022-03-14 RX ADMIN — SODIUM CHLORIDE 2 G: 9 INJECTION, SOLUTION INTRAVENOUS at 05:19

## 2022-03-14 RX ADMIN — HEPARIN SODIUM 5000 UNITS: 5000 INJECTION INTRAVENOUS; SUBCUTANEOUS at 09:07

## 2022-03-14 RX ADMIN — SIMETHICONE 80 MG: 80 TABLET, CHEWABLE ORAL at 20:18

## 2022-03-14 RX ADMIN — SODIUM CHLORIDE 2 G: 9 INJECTION, SOLUTION INTRAVENOUS at 20:17

## 2022-03-14 RX ADMIN — POLYETHYLENE GLYCOL 3350 17 G: 17 POWDER, FOR SOLUTION ORAL at 09:08

## 2022-03-14 RX ADMIN — DOCUSATE SODIUM 50 MG AND SENNOSIDES 8.6 MG 2 TABLET: 8.6; 5 TABLET, FILM COATED ORAL at 20:18

## 2022-03-14 RX ADMIN — SODIUM CHLORIDE 2 G: 9 INJECTION, SOLUTION INTRAVENOUS at 12:30

## 2022-03-14 RX ADMIN — HEPARIN SODIUM 5000 UNITS: 5000 INJECTION INTRAVENOUS; SUBCUTANEOUS at 20:16

## 2022-03-14 RX ADMIN — Medication 10 ML: at 20:18

## 2022-03-14 RX ADMIN — Medication 10 ML: at 09:08

## 2022-03-14 RX ADMIN — PANTOPRAZOLE SODIUM 40 MG: 40 TABLET, DELAYED RELEASE ORAL at 05:18

## 2022-03-14 RX ADMIN — DOCUSATE SODIUM 50 MG AND SENNOSIDES 8.6 MG 2 TABLET: 8.6; 5 TABLET, FILM COATED ORAL at 09:07

## 2022-03-14 NOTE — PROGRESS NOTES
[facility]     Patient Identification:  Name:  Awais Hernandez  Age:  31 y.o.  Sex:  female  :  1990  MRN:  [id number]  Visit Number:  [id number]  Primary Care Provider:  Provider, No Known    Length of stay:  0    Subjective: Patient seen and examined assisted by Rolf Hernandez RN.  Patient is awake and alert, living partner present at bedside, she has large bowel movement, she feels less distended, she is starting to eat.  No nausea or vomiting.    Chief Complaint: Abdominal pain  ----------------------------------------------------------------------------------------------------------------------  Current Hospital Meds:  aztreonam, 2 g, Intravenous, Q8H  heparin (porcine), 5,000 Units, Subcutaneous, Q12H  pantoprazole, 40 mg, Oral, Q AM  polyethylene glycol, 17 g, Oral, Daily  senna-docusate sodium, 2 tablet, Oral, BID  simethicone, 80 mg, Oral, 4x Daily AC & at Bedtime  sodium chloride, 10 mL, Intravenous, Q12H      sodium chloride, 30 mL/hr, Last Rate: 75 mL/hr (22 1227)      ----------------------------------------------------------------------------------------------------------------------  Vital Signs:  Temp:  [98 °F (36.7 °C)-98.5 °F (36.9 °C)] 98 °F (36.7 °C)  Heart Rate:  [61-78] 68  Resp:  [18] 18  BP: ()/(62-79) 116/68       Tele:       22  1531 22  0500   Weight: 59 kg (130 lb) 69.4 kg (152 lb 14.4 oz) (Last weight was a stated weight, not a scale weight.)     Body mass index is 23.95 kg/m².    Intake/Output Summary (Last 24 hours) at 3/14/2022 1455  Last data filed at 3/14/2022 1335  Gross per 24 hour   Intake 840 ml   Output --   Net 840 ml     Diet Regular  ----------------------------------------------------------------------------------------------------------------------  Physical exam:  General: Comfortable,awake, alert, oriented to self, place, and time, conversant No respiratory distress.    Skin:  Skin is warm and  dry. No rash noted. No pallor.    HENT:  Head:  Normocephalic and atraumatic.  Mouth:  Moist mucous membranes.    Eyes:  Conjunctivae and EOM are normal.  Pupils are equal, round, and reactive to light.  No scleral icterus.    Neck:  Neck supple.  No JVD present.    Pulmonary/Chest:  No respiratory distress, no wheezes, no crackles, with normal breath sounds and good air movement.  Cardiovascular:  Normal rate, regular rhythm and normal heart sounds with no murmur.  Abdominal: Protuberant, tympanic, soft no guarding noted, no rigidity, no tenderness soft.    Extremities: Significant deformity contractures upper extremities and lower extremities are also slightly contracted   Neurological: Patient is quadriplegic C5 level   Genitourinary: No Esposito catheter  Back: Right gluteal ulcer  coccyx scar noted  ----------------------------------------------------------------------------------------------------------------------  ----------------------------------------------------------------------------------------------------------------------      Results from last 7 days   Lab Units 03/13/22  0130 03/12/22  1535 03/09/22  1252   CRP mg/dL  --  0.45 2.47*   WBC 10*3/mm3 7.04 8.91 8.68   HEMOGLOBIN g/dL 11.7* 11.5* 10.8*   HEMATOCRIT % 38.3 36.7 34.1   MCV fL 100.8* 97.3* 96.3   MCHC g/dL 30.5* 31.3* 31.7   PLATELETS 10*3/mm3 232 244 232         Results from last 7 days   Lab Units 03/13/22  0130 03/12/22  1535 03/09/22  1252   SODIUM mmol/L 135* 135* 133*   POTASSIUM mmol/L 4.2 4.1 4.1   CHLORIDE mmol/L 103 101 105   CO2 mmol/L 22.1 23.8 18.5*   BUN mg/dL 19 18 22*   CREATININE mg/dL 1.44* 1.37* 1.61*   CALCIUM mg/dL 8.0* 8.3* 8.0*   GLUCOSE mg/dL 101* 98 101*   ALBUMIN g/dL 3.33* 3.41* 3.13*   BILIRUBIN mg/dL 0.3 0.3 0.3   ALK PHOS U/L 80 78 82   AST (SGOT) U/L 16 16 11   ALT (SGPT) U/L 8 9 6   Estimated Creatinine Clearance: 62.6 mL/min (A) (by C-G formula based on SCr of 1.44 mg/dL (H)).    No results found for:  AMMONIA      No results found for: BLOODCX  Urine Culture   Date Value Ref Range Status   03/12/2022 <25,000 CFU/mL Mixed Elder Isolated  Final     No results found for: WOUNDCX  No results found for: STOOLCX    I have personally looked at the labs and they are summarized above.  ----------------------------------------------------------------------------------------------------------------------  Imaging Results (Last 24 Hours)     ** No results found for the last 24 hours. **        ----------------------------------------------------------------------------------------------------------------------  Assessment and Plan:  -Constipation with abdominal distention  -Moderate hiatal hernia  -C5 quadriplegia posttraumatic  -CKD stage IIIb  -Right hydronephrosis  -Cholelithiasis  -Hypoalbuminemia  -Right-sided pyelonephritis, cultures normal elder.  -Gluteal decubiti  -Severe contractures    Decrease IV fluids, patient may have chronic neurogenic bladder, will ask for post void residual, depending on the findings will request urology consult.  Other possibility is?  Nephrolithiasis.  Wound care, supportive care.    Disposition Home when stable      Ana Lobato MD  03/14/22  14:55 EDT

## 2022-03-14 NOTE — PLAN OF CARE
Goal Outcome Evaluation:  Plan of Care Reviewed With: patient           Outcome Evaluation: Pt resting in bed at this time with spouse by bedside helping with care.  VSS at this time.  Will continue to follow plan of care.

## 2022-03-14 NOTE — NURSING NOTE
Patient with healed PI to coccyx.  Scar tissue noted.  She does have some dried yellow drainage to wound edges but no visible opening.    Patient also with healing PI to right lower gluteal.  Wound mostly covered in scar tissue.  She does have 2 small open areas to this wound.  They measure 0.5x0.5x0.3 each.  Cleansed with NS, packed with NS dampened packing strip, covered with 2x2 dry gauze and secured with silicone border dressing.      PI prevention orders initiated.       03/14/22 1330   Wound 03/13/22 0638 Bilateral upper coccyx Pressure Injury   Placement Date/Time: 03/13/22 0638   Present on Hospital Admission: Yes  Side: Bilateral  Orientation: upper  Location: coccyx  Primary Wound Type: Pressure Injury   Pressure Injury Stage O  (Healed stage 4 PI)   Dressing Appearance open to air   Closure None   Base clean;dry;other (see comments)  (scar tissue)   Periwound intact;pink   Periwound Temperature warm   Periwound Skin Turgor firm   Edges rolled/closed   Drainage Amount none   Dressing Care open to air   Wound 03/13/22 0639 Right lower gluteal Pressure Injury   Placement Date/Time: 03/13/22 0639   Present on Hospital Admission: Yes  Side: Right  Orientation: lower  Location: gluteal  Primary Wound Type: Pressure Injury   Pressure Injury Stage O  (Healing PI)   Dressing Appearance open to air   Closure None   Base other (see comments);moist  (scar tissue with 2 small open wounds)   Periwound intact;dry   Periwound Temperature warm   Periwound Skin Turgor firm   Edges irregular   Drainage Amount none   Care, Wound cleansed with;sterile normal saline   Dressing Care dressing applied;packed with;packing strip;gauze, dry   Periwound Care dry periwound area maintained

## 2022-03-14 NOTE — PLAN OF CARE
Goal Outcome Evaluation:           Progress: improving  Outcome Evaluation: Pt resting in bed this shift w/ spouse at bedside assisting w/ care. AOx4, VSS, no complaints or s/s of acute distress noted. Wound care performed, see LDA and relevent notes. IV access and telemetry monitoring patent and maintained, will continue to monitor.

## 2022-03-15 PROCEDURE — 99225 PR SBSQ OBSERVATION CARE/DAY 25 MINUTES: CPT | Performed by: HOSPITALIST

## 2022-03-15 PROCEDURE — G0378 HOSPITAL OBSERVATION PER HR: HCPCS

## 2022-03-15 PROCEDURE — 96372 THER/PROPH/DIAG INJ SC/IM: CPT

## 2022-03-15 PROCEDURE — 51798 US URINE CAPACITY MEASURE: CPT

## 2022-03-15 PROCEDURE — 25010000002 HEPARIN (PORCINE) PER 1000 UNITS: Performed by: INTERNAL MEDICINE

## 2022-03-15 PROCEDURE — 96366 THER/PROPH/DIAG IV INF ADDON: CPT

## 2022-03-15 RX ADMIN — DOCUSATE SODIUM 50 MG AND SENNOSIDES 8.6 MG 2 TABLET: 8.6; 5 TABLET, FILM COATED ORAL at 20:56

## 2022-03-15 RX ADMIN — SODIUM CHLORIDE 2 G: 9 INJECTION, SOLUTION INTRAVENOUS at 13:44

## 2022-03-15 RX ADMIN — HEPARIN SODIUM 5000 UNITS: 5000 INJECTION INTRAVENOUS; SUBCUTANEOUS at 20:56

## 2022-03-15 RX ADMIN — DOCUSATE SODIUM 50 MG AND SENNOSIDES 8.6 MG 2 TABLET: 8.6; 5 TABLET, FILM COATED ORAL at 08:39

## 2022-03-15 RX ADMIN — PANTOPRAZOLE SODIUM 40 MG: 40 TABLET, DELAYED RELEASE ORAL at 05:35

## 2022-03-15 RX ADMIN — SIMETHICONE 80 MG: 80 TABLET, CHEWABLE ORAL at 17:16

## 2022-03-15 RX ADMIN — Medication 10 ML: at 20:56

## 2022-03-15 RX ADMIN — SODIUM CHLORIDE 2 G: 9 INJECTION, SOLUTION INTRAVENOUS at 05:36

## 2022-03-15 RX ADMIN — Medication 10 ML: at 08:40

## 2022-03-15 RX ADMIN — SODIUM CHLORIDE 2 G: 9 INJECTION, SOLUTION INTRAVENOUS at 22:08

## 2022-03-15 RX ADMIN — SIMETHICONE 80 MG: 80 TABLET, CHEWABLE ORAL at 05:35

## 2022-03-15 RX ADMIN — SIMETHICONE 80 MG: 80 TABLET, CHEWABLE ORAL at 20:56

## 2022-03-15 RX ADMIN — POLYETHYLENE GLYCOL 3350 17 G: 17 POWDER, FOR SOLUTION ORAL at 08:39

## 2022-03-15 RX ADMIN — HEPARIN SODIUM 5000 UNITS: 5000 INJECTION INTRAVENOUS; SUBCUTANEOUS at 08:39

## 2022-03-15 RX ADMIN — SIMETHICONE 80 MG: 80 TABLET, CHEWABLE ORAL at 10:41

## 2022-03-15 NOTE — PROGRESS NOTES
[facility]     Patient Identification:  Name:  Awais Hernandez  Age:  31 y.o.  Sex:  female  :  1990  MRN:  [id number]  Visit Number:  [id number]  Primary Care Provider:  Provider, No Known    Length of stay:  0    Subjective: Patient seen and examined assisted by Rolf Hernandez RN.  Patient is awake and alert, reports she is being moving her bowels, eating well no nausea or vomiting, post void he received ranges from 150 mL to 180 mL.    Chief Complaint: Abdominal pain  ----------------------------------------------------------------------------------------------------------------------  Current Hospital Meds:  aztreonam, 2 g, Intravenous, Q8H  heparin (porcine), 5,000 Units, Subcutaneous, Q12H  pantoprazole, 40 mg, Oral, Q AM  polyethylene glycol, 17 g, Oral, Daily  senna-docusate sodium, 2 tablet, Oral, BID  simethicone, 80 mg, Oral, 4x Daily AC & at Bedtime  sodium chloride, 10 mL, Intravenous, Q12H      sodium chloride, 30 mL/hr, Last Rate: 30 mL/hr (03/15/22 0844)      ----------------------------------------------------------------------------------------------------------------------  Vital Signs:  Temp:  [98 °F (36.7 °C)-98.3 °F (36.8 °C)] 98 °F (36.7 °C)  Heart Rate:  [68-82] 71  Resp:  [18] 18  BP: (107-116)/(60-73) 110/60       Tele:       22  1531 22  0500 03/15/22  0500   Weight: 59 kg (130 lb) 69.4 kg (152 lb 14.4 oz) (Last weight was a stated weight, not a scale weight.) 68.2 kg (150 lb 6.4 oz)     Body mass index is 23.56 kg/m².    Intake/Output Summary (Last 24 hours) at 3/15/2022 1312  Last data filed at 3/15/2022 0844  Gross per 24 hour   Intake 2560 ml   Output --   Net 2560 ml     Diet Regular  ----------------------------------------------------------------------------------------------------------------------  Physical exam:  General: In bed, chronically ill-appearing, awake and alert, very pleasant, conversant   No respiratory  distress.    Skin:  Skin is warm and dry. No rash noted. No pallor.    HENT:  Head:  Normocephalic and atraumatic.  Mouth:  Moist mucous membranes.    Eyes:  Conjunctivae and EOM are normal.  Pupils are equal, round, and reactive to light.  No scleral icterus.    Neck:  Neck supple.  No JVD present.    Pulmonary/Chest:  No respiratory distress, no wheezes, no crackles, with normal breath sounds and good air movement.  Cardiovascular:  Normal rate, regular rhythm and normal heart sounds with no murmur.  Abdominal:  Soft.  Bowel sounds are normal.  No distension and no tenderness.   Extremities: Severe contractures of both upper extremity particular the hands, both lower extremities also contracted, good pedal pulse no cyanosis or clubbing   Neurological: Patient is C5 level quadriplegic, no slurring of speech   genitourinary: No Esposito catheter  Back: Right gluteal decubiti  ----------------------------------------------------------------------------------------------------------------------  ----------------------------------------------------------------------------------------------------------------------      Results from last 7 days   Lab Units 03/13/22  0130 03/12/22  1535 03/09/22  1252   CRP mg/dL  --  0.45 2.47*   WBC 10*3/mm3 7.04 8.91 8.68   HEMOGLOBIN g/dL 11.7* 11.5* 10.8*   HEMATOCRIT % 38.3 36.7 34.1   MCV fL 100.8* 97.3* 96.3   MCHC g/dL 30.5* 31.3* 31.7   PLATELETS 10*3/mm3 232 244 232         Results from last 7 days   Lab Units 03/13/22  0130 03/12/22  1535 03/09/22  1252   SODIUM mmol/L 135* 135* 133*   POTASSIUM mmol/L 4.2 4.1 4.1   CHLORIDE mmol/L 103 101 105   CO2 mmol/L 22.1 23.8 18.5*   BUN mg/dL 19 18 22*   CREATININE mg/dL 1.44* 1.37* 1.61*   CALCIUM mg/dL 8.0* 8.3* 8.0*   GLUCOSE mg/dL 101* 98 101*   ALBUMIN g/dL 3.33* 3.41* 3.13*   BILIRUBIN mg/dL 0.3 0.3 0.3   ALK PHOS U/L 80 78 82   AST (SGOT) U/L 16 16 11   ALT (SGPT) U/L 8 9 6   Estimated Creatinine Clearance: 61.5 mL/min (A) (by C-G  formula based on SCr of 1.44 mg/dL (H)).    No results found for: AMMONIA      No results found for: BLOODCX  Urine Culture   Date Value Ref Range Status   03/12/2022 <25,000 CFU/mL Mixed Melony Isolated  Final     No results found for: WOUNDCX  No results found for: STOOLCX    I have personally looked at the labs and they are summarized above.  ----------------------------------------------------------------------------------------------------------------------  Imaging Results (Last 24 Hours)     ** No results found for the last 24 hours. **        ----------------------------------------------------------------------------------------------------------------------  Assessment and Plan:  -Known pain and distention secondary constipation now resolved  -Hydronephrosis, postvoid residuals 150-180 mL, urology consult  -Quadriplegia C5 level from trauma  -Right gluteal ulcer  -Asymptomatic cholelithiasis  -Severe contracture  -Acute kidney injury on CKD stage III  -Hiatal hernia    DC IV fluids, patient is moving her bowels well, tolerating diet, will consult neurology, after which we will discharge if no further intervention needed.    Disposition Home with home health tentatively tomorrow or the next day      Ana Lobato MD  03/15/22  13:12 EDT

## 2022-03-15 NOTE — PLAN OF CARE
Goal Outcome Evaluation:  Plan of Care Reviewed With: patient           Outcome Evaluation: Pt resting in bed at this time with spouse at bedside assisiting with care.  VSS.  No complaints or requests. Will continue to follow plan of care.

## 2022-03-15 NOTE — CASE MANAGEMENT/SOCIAL WORK
Discharge Planning Assessment   [facility]     Patient Name: Awais Hernandez  MRN: [id number]  Today's Date: 3/15/2022    Admit Date: 3/12/2022     Discharge Needs Assessment     Row Name 03/15/22 0928       Living Environment    People in Home significant other    Name(s) of People in Home Lives at home with significant other Juan    Current Living Arrangements home    Primary Care Provided by spouse/significant other    Provides Primary Care For no one    Family Caregiver if Needed significant other    Quality of Family Relationships helpful;involved;supportive    Able to Return to Prior Arrangements yes       Resource/Environmental Concerns    Resource/Environmental Concerns none       Transition Planning    Patient/Family Anticipates Transition to home with family    Patient/Family Anticipated Services at Transition none    Transportation Anticipated family or friend will provide       Discharge Needs Assessment    Equipment Currently Used at Home wheelchair, motorized;oxygen               Discharge Plan     Row Name 03/15/22 0929       Plan    Plan Pt admitted on 3/12/22.  SS received consult per [facility] for discharge planning.  SS spoke with pt and significant other.  Pt lives at home with significant other Juan and plans to return home at discharge.  Pt currently does not utilize home health services.  Pt currently utilizes home 02 and electric wheelchair via unknown provider.  Pt's PCP is Jaxon.  Pt and significant other stated no needs.  SS will follow.              Continued Care and Services - Admitted Since 3/12/2022    Coordination has not been started for this encounter.          Demographic Summary     Row Name 03/15/22 0928       General Information    Admission Type observation    Referral Source nursing    Reason for Consult discharge planning    Preferred Language English                GENIA Hart

## 2022-03-16 VITALS
SYSTOLIC BLOOD PRESSURE: 97 MMHG | HEIGHT: 67 IN | DIASTOLIC BLOOD PRESSURE: 50 MMHG | RESPIRATION RATE: 18 BRPM | WEIGHT: 153.2 LBS | HEART RATE: 73 BPM | OXYGEN SATURATION: 97 % | TEMPERATURE: 97.9 F | BODY MASS INDEX: 24.04 KG/M2

## 2022-03-16 PROCEDURE — 96366 THER/PROPH/DIAG IV INF ADDON: CPT

## 2022-03-16 PROCEDURE — 99221 1ST HOSP IP/OBS SF/LOW 40: CPT | Performed by: UROLOGY

## 2022-03-16 PROCEDURE — 99217 PR OBSERVATION CARE DISCHARGE MANAGEMENT: CPT | Performed by: HOSPITALIST

## 2022-03-16 PROCEDURE — G0378 HOSPITAL OBSERVATION PER HR: HCPCS

## 2022-03-16 RX ORDER — POLYETHYLENE GLYCOL 3350 17 G/17G
17 POWDER, FOR SOLUTION ORAL DAILY
Qty: 30 PACKET | Refills: 3 | Status: SHIPPED | OUTPATIENT
Start: 2022-03-17

## 2022-03-16 RX ORDER — AMOXICILLIN 250 MG
2 CAPSULE ORAL 2 TIMES DAILY
Qty: 60 TABLET | Refills: 3 | Status: SHIPPED | OUTPATIENT
Start: 2022-03-16

## 2022-03-16 RX ADMIN — SIMETHICONE 80 MG: 80 TABLET, CHEWABLE ORAL at 05:22

## 2022-03-16 RX ADMIN — POLYETHYLENE GLYCOL 3350 17 G: 17 POWDER, FOR SOLUTION ORAL at 09:56

## 2022-03-16 RX ADMIN — DOCUSATE SODIUM 50 MG AND SENNOSIDES 8.6 MG 2 TABLET: 8.6; 5 TABLET, FILM COATED ORAL at 09:56

## 2022-03-16 RX ADMIN — Medication 10 ML: at 09:55

## 2022-03-16 RX ADMIN — SODIUM CHLORIDE 2 G: 9 INJECTION, SOLUTION INTRAVENOUS at 05:21

## 2022-03-16 RX ADMIN — PANTOPRAZOLE SODIUM 40 MG: 40 TABLET, DELAYED RELEASE ORAL at 05:22

## 2022-03-16 NOTE — DISCHARGE PLACEMENT REQUEST
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  https://www.vaccines.gov/ helps you find locations that carry COVID-19 vaccines and their contact information. Because every location handles appointments differently, you will need to schedule your appointment directly with the location you choose.          If you have any questions about your recovery, please call the [facility] Nurse Call Center at [phone]. A registered nurse is available 24 hours a day 7 days a week to assist you.   If you have any COVID-19 related questions, please call [phone].                 Icon activity      Activity instructions      Resume as tolerated        Icon diet      Diet instructions      Resume as tolerated          Icon Orders placed today                    Other instructions    Discharge Follow-up with PCP   Currently Documented PCP:   Provider, No Known   PCP Phone Number:   None           What's Next    What's Next          Follow up with No Known Provider  PCP 1 week [facility] [id number]          Ambulatory Referral to Home Health     Home Health Services              Your Allergies  Date Reviewed: 3/13/2022  Your Allergies   Allergen Reactions   Rocephin (Ceftriaxone) Hives       Vancomycin Swelling         Patient Belongings Returned      Document Return of Belongings Flowsheet    Were the patient bedside belongings sent home? N/A   Medications Retrieved from Pharmacy & Sent Home N/A   Belongings Sent to Safe None   Belongings sent with: --   Belongings Retrieved from Security & Sent Home N/A           MyChart Signup    [facility]  Trackway allows you to send messages to your doctor, view your test results, renew your prescriptions, schedule appointments, and more. To sign up, go to VIPTALON and click on the Sign Up Now link in the New User? box. Enter your [employer] Activation Code exactly as it appears below along with the last four digits of your Social Security Number and your Date of Birth () to complete the sign-up process. If you do not sign up before the expiration date, you must request a new code.     [employer] Activation Code: [email or web address]  Expires: 2022  4:43 PM     If you have questions, you can email [email or web address] or call [phone] to talk to our [employer] staff. Remember, [employer] is NOT to be used for urgent needs. For medical emergencies, dial 911.       Medication List    Medication List     Morning Afternoon Evening Bedtime As Needed    albuterol sulfate  (90 Base) MCG/ACT inhaler  Commonly known as: PROVENTIL HFA;VENTOLIN HFA;PROAIR HFA  Inhale 2 puffs Every 6 (Six) Hours As Needed for Wheezing.         dicyclomine 10 MG capsule  Commonly known as: BENTYL  Take 10 mg by mouth 3 (Three) Times a Day As Needed (Cramping).         nitrofurantoin (macrocrystal-monohydrate) 100 MG capsule  Commonly known as: MACROBID  Take 100 mg by mouth 2 (Two) Times a Day.         phenazopyridine 200 MG tablet  Commonly known as: PYRIDIUM  Take 200 mg by mouth 3 (Three) Times a Day As Needed for Bladder Spasms.        Icon medications to start taking   sennosides-docusate 8.6-50 MG per tablet  Commonly known as: PERICOLACE  Take 2 tablets by mouth 2 (Two) Times a Day. Hold temporarily for day or 2 if BM is more than twice a day.  Last time this was given: 2 tablets on 2022  9:56 AM            Where to  your medications     Icon medication  information                     these medications at [facility]     sennosides-docusate    Address: [address] [address]   Hours: 8AM-6PM Mon-Fri   Phone: [phone]           Always carry an updated list of your medications with you. If there is an emergency, a responder can quickly see what medications you are taking. Take this paperwork with you the next time you see your health care provider.              [employer] Sign-Up    Send messages to your doctor, view your test results, renew your prescriptions, schedule appointments, and more.     Go to [email or web address], click \"Sign Up Now\", and enter your personal activation code: [email or web address]. Activation code expires 4/8/2022.      Icon List of Attachments     Attached Information  Constipation  Adult  Easy-to-Read (English)  Constipation, Adult  Constipation is when a person has trouble pooping (having a bowel movement). When you have this condition, you may poop fewer than 3 times a week. Your poop (stool) may also be dry, hard, or bigger than normal.  Follow these instructions at home:  Eating and drinking  ?  · Eat foods that have a lot of fiber, such as:  ? Fresh fruits and vegetables.  ? Whole grains.  ? Beans.  · Eat less of foods that are low in fiber and high in fat and sugar, such as:  ? French fries.  ? Hamburgers.  ? Cookies.  ? Candy.  ? Soda.  · Drink enough fluid to keep your pee (urine) pale yellow.     General instructions  · Exercise regularly or as told by your doctor. Try to do 150 minutes of exercise each week.  · Go to the restroom when you feel like you need to poop. Do not hold it in.  · Take over-the-counter and prescription medicines only as told by your doctor. These include any fiber supplements.  · When you poop:  ? Do deep breathing while relaxing your lower belly (abdomen).  ? Relax your pelvic floor. The pelvic floor is a group of muscles that support the rectum, bladder, and intestines (as well as the uterus in women).  · Watch your condition for any changes. Tell your doctor if you notice " any.  · Keep all follow-up visits as told by your doctor. This is important.  Contact a doctor if:  · You have pain that gets worse.  · You have a fever.  · You have not pooped for 4 days.  · You vomit.  · You are not hungry.  · You lose weight.  · You are bleeding from the opening of the butt (anus).  · You have thin, pencil-like poop.  Get help right away if:  · You have a fever, and your symptoms suddenly get worse.  · You leak poop or have blood in your poop.  · Your belly feels hard or bigger than normal (bloated).  · You have very bad belly pain.  · You feel dizzy or you faint.  Summary  · Constipation is when a person poops fewer than 3 times a week, has trouble pooping, or has poop that is dry, hard, or bigger than normal.  · Eat foods that have a lot of fiber.  · Drink enough fluid to keep your pee (urine) pale yellow.  · Take over-the-counter and prescription medicines only as told by your doctor. These include any fiber supplements.  This information is not intended to replace advice given to you by your health care provider. Make sure you discuss any questions you have with your health care provider.  Document Revised: 11/04/2020 Document Reviewed: 11/04/2020  [employer] Patient Education © 2021 [employer].         Icon List of Attachments     Attached Information  Hydronephrosis (English)  Hydronephrosis  ?  Hydronephrosis is the swelling of one or both kidneys due to a blockage that stops urine from flowing out of the body. Kidneys filter waste from the blood and produce urine. This condition can lead to kidney failure and may become life-threatening if not treated promptly.  What are the causes?  In infants and children, common causes include problems that occur when a baby is developing in the womb. These can include problems in the kidneys or in the tubes that drain urine into the bladder (ureters).  In adults, common causes include:  · Kidney stones.  · Pregnancy.  · A tumor or cyst in the abdomen  or pelvis.  · An enlarged prostate gland.  Other causes include:  · Bladder infection.  · Scar tissue from a previous surgery or injury.  · A blood clot.  · Cancer of the prostate, bladder, uterus, ovary, or colon.  What are the signs or symptoms?  Symptoms of this condition include:  · Pain or discomfort in your side (flank) or abdomen.  · Swelling in your abdomen.  · Nausea and vomiting.  · Fever.  · Pain when passing urine.  · Feelings of urgency when you need to urinate.  · Urinating more often than normal.  In some cases, you may not have any symptoms.  How is this diagnosed?  This condition may be diagnosed based on:  · Your symptoms and medical history.  · A physical exam.  · Blood and urine tests.  · Imaging tests, such as an ultrasound, CT scan, or MRI.  · A procedure to look at your urinary tract and bladder by inserting a scope into the urethra (cystoscopy).  How is this treated?  Treatment for this condition depends on where the blockage is, how long it has been there, and what caused it. The goal of treatment is to remove the blockage. Treatment may include:  · Antibiotic medicines to treat or prevent infection.  · A procedure to place a small, thin tube (stent) into a blocked ureter. The stent will keep the ureter open so that urine can drain through it.  · A nonsurgical procedure that crushes kidney stones with shock waves (extracorporeal shock wave lithotripsy).  · If kidney failure occurs, treatment may include dialysis or a kidney transplant.  Follow these instructions at home:  ?  · Take over-the-counter and prescription medicines only as told by your health care provider.  · If you were prescribed an antibiotic medicine, take it exactly as told by your health care provider. Do not stop taking the antibiotic even if you start to feel better.  · Rest and return to your normal activities as told by your health care provider. Ask your health care provider what activities are safe for you.  · Drink  enough fluid to keep your urine pale yellow.  · Keep all follow-up visits. This is important.  Contact a health care provider if:  · You continue to have symptoms after treatment.  · You develop new symptoms.  · Your urine becomes cloudy or bloody.  · You have a fever.  Get help right away if:  · You have severe flank or abdominal pain.  · You cannot drink fluids without vomiting.  Summary  · Hydronephrosis is the swelling of one or both kidneys due to a blockage that stops urine from flowing out of the body.  · Hydronephrosis can lead to kidney failure and may become life-threatening if not treated promptly.  · The goal of treatment is to remove the blockage. It may include a procedure to insert a stent into a blocked ureter, a procedure to break up kidney stones, or taking antibiotic medicines.  · Follow your health care provider's instructions for taking care of yourself at home, including instructions about drinking fluids, taking medicines, and limiting activities.  This information is not intended to replace advice given to you by your health care provider. Make sure you discuss any questions you have with your health care provider.  Document Revised: 04/06/2021 Document Reviewed: 04/06/2021  [employer] Patient Education © 2021 [employer] Inc.                         Opioid Resource    If you or someone you know needs information on substance abuse, please visit   https://www.findhelpnowky.org/ for listings of facilities and resources across [place].        Stroke Symptoms    · Call 911 or have someone take you to the Emergency Department if you have any of the following:  · Sudden numbness or weakness of your face, arm or leg especially on one side of the body  · Sudden confusion, difficulty speaking or trouble understanding   · Changes in your vision or loss of sight in one eye  · Sudden severe headache with no known cause  · Sudden dizziness, trouble walking, loss of balance or coordination     It is important  to seek emergency care right away if you have further stroke symptoms. If you get emergency help quickly, the powerful clot-dissolving medicines can reduce the disabilities caused by a stroke.      For more information:  American Stroke Association  1-712-6-STROKE  www.strokeassociation.org        Smoking Cessation    IF YOU SMOKE OR USE TOBACCO PLEASE READ THE FOLLOWING:  Why is smoking bad for me?  Smoking increases the risk of heart disease, lung disease, vascular disease, stroke, and cancer. If you smoke, STOP!     For more information:  Quit Now [place]  1-800-QUIT-NOW  https://[employer] [facility] PlaceEn[facility] HealthSaveUp.quitlogix.org/en-US/        Suicidal Feelings    If you feel like life is too tough and are thinking of suicide or injuring yourself, get help right away!  · Call 911  · Call a suicide hotline to speak to a counselor. 1-732-362-TALK or 2-266-QVVXKRC         Patient Experience    Thank you for choosing [facility]. You may receive a survey following your visit. Please take a moment to share what went well, where we need improvement, and which staff members deserve recognition. We value your input.               ? ?                YOU ARE THE MOST IMPORTANT FACTOR IN YOUR RECOVERY.      Follow all instructions carefully.      I have reviewed my discharge instructions with my nurse, including the following information, if applicable:                 Information about my illness and diagnosis              Follow up appointments (including lab draws)              Wound Care              Equipment Needs              Medications (new and continuing) along with side effects              Preventative information such as vaccines and smoking cessations              Diet              Pain              I know when to contact my Doctor's office or seek emergency care        I want my nurse to describe the side effects of my medications: YES NO   If the answer is no, I understand the side effects of my medications: YES NO   My nurse  described the side effects of my medications in a way that I could understand: YES NO   I have taken my personal belongings and my own medications with me at discharge: YES NO          I have received this information and my questions have been answered. I have discussed any concerns I see with this plan with the nurse or physician. I understand these instructions.     Signature of Patient or Responsible Person: _____________________________________     Date: _________________  Time: __________________     Signature of Healthcare Provider: _______________________________________  Date: _________________  Time: __________________           [address]  [facility] [address]  Phone:  [phone]  Fax:  [phone] Date: Mar 16, 2022      Ambulatory Referral to Home Health     Patient:  Awais Hernandez MRN:  [id number]   [address]  [facility] [address] :  1990  SSN:    Phone: [phone] Sex:  F      INSURANCE PAYOR PLAN GROUP # SUBSCRIBER ID   Primary:    [place] MEDICAID [id number]   [id number]      Referring Provider Information:  TOMMY LOUIS Phone: [phone] Fax: [phone]      Referral Information:   # Visits:  999 Referral Type: Home Health [42]   Urgency:  Routine Referral Reason: Specialty Services Required   Start Date: Mar 16, 2022 End Date:  To be determined by Insurer   Diagnosis: Pressure injury of skin of sacral region, unspecified injury stage (L89.159 [ICD-10-CM] 707.03,707.20 [ICD-9-CM])      Refer to Dept:   Refer to Provider:   Refer to Provider Phone:   Refer to Facility:       Face to Face Visit Date: 3/16/2022  Follow-up provider for Plan of Care? I treated the patient in an acute care facility and will not continue treatment after discharge.  Follow-up provider: NAVEED KUMAR [[id number]]  Reason/Clinical Findings: Patient is quadriplegic has gluteal wounds last decubitus ulcer needs wound  Describe mobility limitations that make leaving home difficult:  Patient is quadriplegic, requires help to be able to  Nursing/Therapeutic Services Requested: Skilled Nursing  Skilled nursing orders: Wound care dressing/changes  Frequency: 1 Week 1     This document serves as a request of services and does not constitute Insurance authorization or approval of services.  To determine eligibility, please contact the members Insurance carrier to verify and review coverage.     If you have medical questions regarding this request for services. Please contact [address] at [phone] during normal business hours.        Authorizing Provider:Ana Lobato MD  Authorizing Provider's NPI: [id number]  Order Entered By: Ana Lobato MD 3/16/2022  9:52 AM     Electronically signed by: Ana Lobato MD 3/16/2022  9:52 AM          Discharge Summary      Ana Lobato MD at 22 0952              [facility] HOSPITALIST MEDICINE DISCHARGE SUMMARY    Patient Identification:  Name:  Awais Hernandez  Age:  31 y.o.  Sex:  female  :  1990  MRN:  [id number]  Visit Number:  [id number]    Date of Admission: 3/12/2022  Date of Discharge: 2020  DISCHARGE DISPOSITION   Stable  PCP: Provider, No Known    DISCHARGE DIAGNOSIS : Abdominal distention with nausea, likely multifactorial, constipation.  Quadriplegia with C5 injury in the past, gluteal ulcer chronic present on admission, hypotension resolved, probable acute cystitis/UTI cultures normal elder status post treatment.  Neurogenic bladder.  Chronic right-sided hydronephrosis follow-up.  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  Symptomatic cholelithiasis.  Chronic microcytic anemia.  Moderate hiatal hernia.  Contractures bilateral upper extremity and lower extremity.    HOSPITAL COURSE  Patient is a 31 y.o. female presented to [facility]  complaining of abdominal distention and protuberance associate with nausea, CT scan of the abdomen pelvis showed generalized distention no obvious obstruction, there were some stools distal portion of the colon.  She was also noted to have pyuria and possible UTI, cultures were performed with no growth.  Surgery was consulted, recommended the patient start on MiraLAX and bowel regimen.  She responded very well, multiple large bowel movement and since then no recurrence of abdominal distention.  On evaluation she has also multiple gluteal ulcers particular the right, wound care consulted and recommended local wound care.  Rest of her stay was uneventful.  We did request urology consult due to presence of right-sided hydronephrosis which appears to be chronic no change from previous studies.  This was confirmed by urology, secondary to neurogenic bladder.  Occasionally patient does require self-catheterization.  Recommended to follow-up with her urologist which patient prefers at [facility] where she follows.  Respite stay was uneventful, plan is to discharge home today.  Her significant others who is very supportive was present inside her room, was allowed to serve  to perform wound care, home health will also be provided for wound care at home.      VITAL SIGNS:      03/14/22  0500 03/15/22  0500 03/16/22  0500   Weight: 69.4 kg (152 lb 14.4 oz) (Last weight was a stated weight, not a scale weight.) 68.2 kg (150 lb 6.4 oz) 69.5 kg (153 lb 3.2 oz)     Body mass index is 23.99 kg/m².  Vitals:    03/16/22 0955   BP: 97/50   Pulse: 73   Resp: 18   Temp: 97.9 °F (36.6 °C)   SpO2: 97%     PHYSICAL EXAM:  General: Currently eating comfortable,awake, alert, oriented to self, place, and time, conversant very pleasant. No respiratory distress.    Skin:  Skin is warm and dry. No rash noted. No pallor.    HENT:  Head:  Normocephalic and atraumatic.  Mouth:  Moist mucous membranes.    Eyes:  Conjunctivae and EOM  are normal.  Pupils are equal, round, and reactive to light.  No scleral icterus.    Neck:  Neck supple.  No JVD present.    Pulmonary/Chest:  No respiratory distress, no wheezes, no crackles, with normal breath sounds and good air movement.  Cardiovascular:  Normal rate, regular rhythm and normal heart sounds with no murmur.  Abdominal:  Soft.  Bowel sounds are normal.  No distension and no tenderness.   Extremities: Significant contractures both hands/upper extremity, lower extremity slightly contracted.  Neurological: Patient is C5 quadriplegic,  Genitourinary: Gluteal ulcers as previously described, no Esposito catheter  Back:  ----------    DISCHARGE MEDICATIONS:     Discharge Medications      New Medications      Instructions Start Date   polyethylene glycol 17 g packet  Commonly known as: MIRALAX   17 g, Oral, Daily   Start Date: March 17, 2022     sennosides-docusate 8.6-50 MG per tablet  Commonly known as: PERICOLACE   Take 2 tablets by mouth 2 (Two) Times a Day. Hold temporarily for day or 2 if bowel movement is more than twice a day.         Continue These Medications      Instructions Start Date   albuterol sulfate  (90 Base) MCG/ACT inhaler  Commonly known as: PROVENTIL HFA;VENTOLIN HFA;PROAIR HFA   2 puffs, Inhalation, Every 6 Hours PRN      dicyclomine 10 MG capsule  Commonly known as: BENTYL   10 mg, Oral, 3 Times Daily PRN      phenazopyridine 200 MG tablet  Commonly known as: PYRIDIUM   200 mg, Oral, 3 Times Daily PRN         Stop These Medications    nitrofurantoin (macrocrystal-monohydrate) 100 MG capsule  Commonly known as: MACROBID            Diet Instructions    Resume as tolerated           Activity Instructions    Resume as tolerated         Additional Instructions for the Follow-ups that You Need to Schedule     Ambulatory Referral to Home Health   As directed      Face to Face Visit Date: 3/16/2022    Follow-up provider for Plan of Care?: I treated the patient in an acute care facility  and will not continue treatment after discharge.    Follow-up provider: NAVEED KUMAR [[id number]]    Reason/Clinical Findings: Patient is quadriplegic has gluteal wounds last decubitus ulcer needs wound    Describe mobility limitations that make leaving home difficult: Patient is quadriplegic, requires help to be able to    Nursing/Therapeutic Services Requested: Skilled Nursing    Skilled nursing orders: Wound care dressing/changes    Frequency: 1 Week 1         Discharge Follow-up with PCP   As directed       Currently Documented PCP:    Provider, No Known    PCP Phone Number:    None     Follow Up Details: PCP 1 week            Follow-up Information     Provider, No Known .    Why: PCP 1 week  Contact information:  [facility] [address]                         Tommy Lobato MD  03/16/22  10:08 EDT    Please note that this discharge summary required more than 30 minutes to complete.      Electronically signed by Tommy Lobato MD at 03/16/22 1008       Discharge Order (From admission, onward)     Start     Ordered    03/16/22 0948  Discharge patient  Once        Expected Discharge Date: 03/16/22    Discharge Disposition: Home-Health Care [facility]    Physician of Record for Attribution - Please select from Treatment Team: TOMMY LOBATO [[id number]]    Review needed by CMO to determine Physician of Record: No       Question Answer Comment   Physician of Record for Attribution - Please select from Treatment Team TOMMY LOBATO    Review needed by CMO to determine Physician of Record No        03/16/22 0952

## 2022-03-16 NOTE — PLAN OF CARE
Goal Outcome Evaluation:   Vitals stable throughout. Pt significant other maintained at bedside during shift, involved in care. Dressing change to rt gluteal/thigh. Assisted CNA in bed bath. IV antibiotics provided as scheduled. Pt stomach still appears distended but not as much as previously according to significant other. Multiple loose BM during shift.

## 2022-03-16 NOTE — DISCHARGE SUMMARY
[facility] HOSPITALIST MEDICINE DISCHARGE SUMMARY    Patient Identification:  Name:  Awais Hernandez  Age:  31 y.o.  Sex:  female  :  1990  MRN:  [id number]  Visit Number:  [id number]    Date of Admission: 3/12/2022  Date of Discharge: 2022  DISCHARGE DISPOSITION   Stable  PCP: Provider, No Known    DISCHARGE DIAGNOSIS : Abdominal distention with nausea, likely multifactorial, constipation.  Quadriplegia with C5 injury in the past, gluteal ulcer chronic present on admission, hypotension resolved, probable acute cystitis/UTI cultures normal elder status post treatment.  Neurogenic bladder.  Chronic right-sided hydronephrosis follow-up.  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  Symptomatic cholelithiasis.  Chronic microcytic anemia.  Moderate hiatal hernia.  Contractures bilateral upper extremity and lower extremity.    HOSPITAL COURSE  Patient is a 31 y.o. female presented to [facility] complaining of abdominal distention and protuberance associate with nausea, CT scan of the abdomen pelvis showed generalized distention no obvious obstruction, there were some stools distal portion of the colon.  She was also noted to have pyuria and possible UTI, cultures were performed with no growth.  Surgery was consulted, recommended the patient start on MiraLAX and bowel regimen.  She responded very well, multiple large bowel movement and since then no recurrence of abdominal distention.  On evaluation she has also multiple gluteal ulcers particular the right, wound care consulted and recommended local wound care.  Rest of her stay was uneventful.  We did request urology consult due to presence of right-sided hydronephrosis which appears to be chronic no change from previous studies.  This was confirmed by urology, secondary to neurogenic bladder.  Occasionally  patient does require self-catheterization.  Recommended to follow-up with her urologist which patient prefers at [facility] where she follows.  Respite stay was uneventful, plan is to discharge home today.  Her significant others who is very supportive was present inside her room, was allowed to serve  to perform wound care, home health will also be provided for wound care at home.      VITAL SIGNS:      03/14/22  0500 03/15/22  0500 03/16/22  0500   Weight: 69.4 kg (152 lb 14.4 oz) (Last weight was a stated weight, not a scale weight.) 68.2 kg (150 lb 6.4 oz) 69.5 kg (153 lb 3.2 oz)     Body mass index is 23.99 kg/m².  Vitals:    03/16/22 0955   BP: 97/50   Pulse: 73   Resp: 18   Temp: 97.9 °F (36.6 °C)   SpO2: 97%     PHYSICAL EXAM:  General: Currently eating comfortable,awake, alert, oriented to self, place, and time, conversant very pleasant. No respiratory distress.    Skin:  Skin is warm and dry. No rash noted. No pallor.    HENT:  Head:  Normocephalic and atraumatic.  Mouth:  Moist mucous membranes.    Eyes:  Conjunctivae and EOM are normal.  Pupils are equal, round, and reactive to light.  No scleral icterus.    Neck:  Neck supple.  No JVD present.    Pulmonary/Chest:  No respiratory distress, no wheezes, no crackles, with normal breath sounds and good air movement.  Cardiovascular:  Normal rate, regular rhythm and normal heart sounds with no murmur.  Abdominal:  Soft.  Bowel sounds are normal.  No distension and no tenderness.   Extremities: Significant contractures both hands/upper extremity, lower extremity slightly contracted.  Neurological: Patient is C5 quadriplegic,  Genitourinary: Gluteal ulcers as previously described, no Esposito catheter  Back:  ----------    DISCHARGE MEDICATIONS:     Discharge Medications      New Medications      Instructions Start Date   polyethylene glycol 17 g packet  Commonly known as: MIRALAX   17 g, Oral, Daily   Start Date: March 17, 2022      sennosides-docusate 8.6-50 MG per tablet  Commonly known as: PERICOLACE   Take 2 tablets by mouth 2 (Two) Times a Day. Hold temporarily for day or 2 if bowel movement is more than twice a day.         Continue These Medications      Instructions Start Date   albuterol sulfate  (90 Base) MCG/ACT inhaler  Commonly known as: PROVENTIL HFA;VENTOLIN HFA;PROAIR HFA   2 puffs, Inhalation, Every 6 Hours PRN      dicyclomine 10 MG capsule  Commonly known as: BENTYL   10 mg, Oral, 3 Times Daily PRN      phenazopyridine 200 MG tablet  Commonly known as: PYRIDIUM   200 mg, Oral, 3 Times Daily PRN         Stop These Medications    nitrofurantoin (macrocrystal-monohydrate) 100 MG capsule  Commonly known as: MACROBID            Diet Instructions    Resume as tolerated           Activity Instructions    Resume as tolerated         Additional Instructions for the Follow-ups that You Need to Schedule     Ambulatory Referral to Home Health   As directed      Face to Face Visit Date: 3/16/2022    Follow-up provider for Plan of Care?: I treated the patient in an acute care facility and will not continue treatment after discharge.    Follow-up provider: NAVEED KUMAR [[id number]]    Reason/Clinical Findings: Patient is quadriplegic has gluteal wounds last decubitus ulcer needs wound    Describe mobility limitations that make leaving home difficult: Patient is quadriplegic, requires help to be able to    Nursing/Therapeutic Services Requested: Skilled Nursing    Skilled nursing orders: Wound care dressing/changes    Frequency: 1 Week 1         Discharge Follow-up with PCP   As directed       Currently Documented PCP:    Provider, No Known    PCP Phone Number:    None     Follow Up Details: PCP 1 week            Follow-up Information     Provider, No Known .    Why: PCP 1 week  Contact information:  [facility] [id number]                         Ana Lobato MD  03/16/22  10:08 EDT    Please note that this  discharge summary required more than 30 minutes to complete.

## 2022-03-16 NOTE — CASE MANAGEMENT/SOCIAL WORK
Discharge Planning Assessment   Yovany     Patient Name: Awais Hernandez  MRN: [id number]  Today's Date: 3/16/2022    Admit Date: 3/12/2022       Discharge Plan     Row Name 03/16/22 1035       Plan    Plan SS spoke with pt and pt's significant other on this date.  Pt stated no preference for home health provider.  SS made referral to [facility] at [phone] and faxed referral to [phone].                  AILEEN HartW

## 2022-03-16 NOTE — CASE MANAGEMENT/SOCIAL WORK
Discharge Planning Assessment   Yovany     Patient Name: Awais Hernandez  MRN: [id number]  Today's Date: 3/16/2022    Admit Date: 3/12/2022          Discharge Plan     Row Name 03/16/22 1335       Plan    Plan Per Dianne with [facility]  they are unable to admit pt at this time.  SS will make referral to [facility].  SS will follow.                  GENIA Hart
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
## 2022-03-16 NOTE — DISCHARGE PLACEMENT REQUEST
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 of muscles that support the rectum, bladder, and intestines (as well as the uterus in women).  · Watch your condition for any changes. Tell your doctor if you notice any.  · Keep all follow-up visits as told by your doctor. This is important.  Contact a doctor if:  · You have pain that gets worse.  · You have a fever.  · You have not pooped for 4 days.  · You vomit.  · You are not hungry.  · You lose weight.  · You are bleeding from the opening of the butt (anus).  · You have thin, pencil-like poop.  Get help right away if:  · You have a fever, and your symptoms suddenly get worse.  · You leak poop or have blood in your poop.  · Your belly feels hard or bigger than normal (bloated).  · You have very bad belly pain.  · You feel dizzy or you faint.  Summary  · Constipation is when a person poops fewer than 3 times a week, has trouble pooping, or has poop that is dry, hard, or bigger than normal.  · Eat foods that have a lot of fiber.  · Drink enough fluid to keep your pee (urine) pale yellow.  · Take over-the-counter and prescription medicines only as told by your doctor. These include any fiber supplements.  This information is not intended to replace advice given to you by your health care provider. Make sure you discuss any questions you have with your health care provider.  Document Revised: 11/04/2020 Document Reviewed: 11/04/2020  [employer] Patient Education © 2021 [employer]         Icon List of Attachments     Attached Information  Hydronephrosis (English)  Hydronephrosis  ?  Hydronephrosis is the swelling of one or both kidneys due to a blockage that stops urine from flowing out of the body. Kidneys filter waste from the blood and produce urine. This condition can lead to kidney  failure and may become life-threatening if not treated promptly.  What are the causes?  In infants and children, common causes include problems that occur when a baby is developing in the womb. These can include problems in the kidneys or in the tubes that drain urine into the bladder (ureters).  In adults, common causes include:  · Kidney stones.  · Pregnancy.  · A tumor or cyst in the abdomen or pelvis.  · An enlarged prostate gland.  Other causes include:  · Bladder infection.  · Scar tissue from a previous surgery or injury.  · A blood clot.  · Cancer of the prostate, bladder, uterus, ovary, or colon.  What are the signs or symptoms?  Symptoms of this condition include:  · Pain or discomfort in your side (flank) or abdomen.  · Swelling in your abdomen.  · Nausea and vomiting.  · Fever.  · Pain when passing urine.  · Feelings of urgency when you need to urinate.  · Urinating more often than normal.  In some cases, you may not have any symptoms.  How is this diagnosed?  This condition may be diagnosed based on:  · Your symptoms and medical history.  · A physical exam.  · Blood and urine tests.  · Imaging tests, such as an ultrasound, CT scan, or MRI.  · A procedure to look at your urinary tract and bladder by inserting a scope into the urethra (cystoscopy).  How is this treated?  Treatment for this condition depends on where the blockage is, how long it has been there, and what caused it. The goal of treatment is to remove the blockage. Treatment may include:  · Antibiotic medicines to treat or prevent infection.  · A procedure to place a small, thin tube (stent) into a blocked ureter. The stent will keep the ureter open so that urine can drain through it.  · A nonsurgical procedure that crushes kidney stones with shock waves (extracorporeal shock wave lithotripsy).  · If kidney failure occurs, treatment may include dialysis or a kidney transplant.  Follow these instructions at home:  ?  · Take over-the-counter  and prescription medicines only as told by your health care provider.  · If you were prescribed an antibiotic medicine, take it exactly as told by your health care provider. Do not stop taking the antibiotic even if you start to feel better.  · Rest and return to your normal activities as told by your health care provider. Ask your health care provider what activities are safe for you.  · Drink enough fluid to keep your urine pale yellow.  · Keep all follow-up visits. This is important.  Contact a health care provider if:  · You continue to have symptoms after treatment.  · You develop new symptoms.  · Your urine becomes cloudy or bloody.  · You have a fever.  Get help right away if:  · You have severe flank or abdominal pain.  · You cannot drink fluids without vomiting.  Summary  · Hydronephrosis is the swelling of one or both kidneys due to a blockage that stops urine from flowing out of the body.  · Hydronephrosis can lead to kidney failure and may become life-threatening if not treated promptly.  · The goal of treatment is to remove the blockage. It may include a procedure to insert a stent into a blocked ureter, a procedure to break up kidney stones, or taking antibiotic medicines.  · Follow your health care provider's instructions for taking care of yourself at home, including instructions about drinking fluids, taking medicines, and limiting activities.  This information is not intended to replace advice given to you by your health care provider. Make sure you discuss any questions you have with your health care provider.  Document Revised: 04/06/2021 Document Reviewed: 04/06/2021  [employer] Patient Education © 2021 [employer]                         Opioid Resource    If you or someone you know needs information on substance abuse, please visit   https://www.findhelpnowky.org/ for listings of facilities and resources across [place].        Stroke Symptoms    · Call 911 or have someone take you to the  Emergency Department if you have any of the following:  · Sudden numbness or weakness of your face, arm or leg especially on one side of the body  · Sudden confusion, difficulty speaking or trouble understanding   · Changes in your vision or loss of sight in one eye  · Sudden severe headache with no known cause  · Sudden dizziness, trouble walking, loss of balance or coordination     It is important to seek emergency care right away if you have further stroke symptoms. If you get emergency help quickly, the powerful clot-dissolving medicines can reduce the disabilities caused by a stroke.      For more information:  American Stroke Association  9-714-5-STROKE  www.strokeassociation.org        Smoking Cessation    IF YOU SMOKE OR USE TOBACCO PLEASE READ THE FOLLOWING:  Why is smoking bad for me?  Smoking increases the risk of heart disease, lung disease, vascular disease, stroke, and cancer. If you smoke, STOP!     For more information:  Quit Now [place]  1-800-QUIT-NOW  https://[place].[employer]celus.org/en-US/        Suicidal Feelings    If you feel like life is too tough and are thinking of suicide or injuring yourself, get help right away!  · Call 911  · Call a suicide hotline to speak to a counselor. 8-899-588-TALK or 4-773-OVIKFJG         Patient Experience    Thank you for choosing [facility]. You may receive a survey following your visit. Please take a moment to share what went well, where we need improvement, and which staff members deserve recognition. We value your input.               ? ?                YOU ARE THE MOST IMPORTANT FACTOR IN YOUR RECOVERY.      Follow all instructions carefully.      I have reviewed my discharge instructions with my nurse, including the following information, if applicable:                 Information about my illness and diagnosis              Follow up appointments (including lab draws)              Wound Care              Equipment Needs              Medications (new  and continuing) along with side effects              Preventative information such as vaccines and smoking cessations              Diet              Pain              I know when to contact my Doctor's office or seek emergency care        I want my nurse to describe the side effects of my medications: YES NO   If the answer is no, I understand the side effects of my medications: YES NO   My nurse described the side effects of my medications in a way that I could understand: YES NO   I have taken my personal belongings and my own medications with me at discharge: YES NO          I have received this information and my questions have been answered. I have discussed any concerns I see with this plan with the nurse or physician. I understand these instructions.     Signature of Patient or Responsible Person: _____________________________________     Date: _________________  Time: __________________     Signature of Healthcare Provider: _______________________________________  Date: _________________  Time: __________________           [facility]  [facility] [address]  Phone:  [phone]  Fax:  [phone] Date: Mar 16, 2022      Ambulatory Referral to Home Health     Patient:  Awais Hernandez MRN:  [id number]   [address]  [facility] [address] :  1990  SSN:    Phone: [phone] Sex:  F      INSURANCE PAYOR PLAN GROUP # SUBSCRIBER ID   Primary:    [place] MEDICAID [id number]   [id number]      Referring Provider Information:  TOMMY LOUIS Phone: [phone] Fax: [phone]      Referral Information:   # Visits:  999 Referral Type: Home Health [42]   Urgency:  Routine Referral Reason: Specialty Services Required   Start Date: Mar 16, 2022 End Date:  To be determined by Insurer   Diagnosis: Pressure injury of skin of sacral region, unspecified injury stage (L89.159 [ICD-10-CM] 707.03,707.20 [ICD-9-CM])      Refer to Dept:   Refer to Provider:   Refer to Provider Phone:    Refer to Facility:       Face to Face Visit Date: 3/16/2022  Follow-up provider for Plan of Care? I treated the patient in an acute care facility and will not continue treatment after discharge.  Follow-up provider: NAVEED KUMAR [[id number]]  Reason/Clinical Findings: Patient is quadriplegic has gluteal wounds last decubitus ulcer needs wound  Describe mobility limitations that make leaving home difficult: Patient is quadriplegic, requires help to be able to  Nursing/Therapeutic Services Requested: Skilled Nursing  Skilled nursing orders: Wound care dressing/changes  Frequency: 1 Week 1     This document serves as a request of services and does not constitute Insurance authorization or approval of services.  To determine eligibility, please contact the members Insurance carrier to verify and review coverage.     If you have medical questions regarding this request for services. Please contact [address] at [phone] during normal business hours.        Authorizing Provider:Ana Lobato MD  Authorizing Provider's NPI: [id number]  Order Entered By: Ana Lobato MD 3/16/2022  9:52 AM     Electronically signed by: Ana Lobato MD 3/16/2022  9:52 AM            Discharge Summary      Ana Lobato MD at 22 0952              [facility] MEDICINE DISCHARGE SUMMARY    Patient Identification:  Name:  Awais Hernandez  Age:  31 y.o.  Sex:  female  :  1990  MRN:  [id number]  Visit Number:  [id number]    Date of Admission: 3/12/2022  Date of Discharge: 2020  DISCHARGE DISPOSITION   Stable  PCP: Provider, No Known    DISCHARGE DIAGNOSIS : Abdominal distention with nausea, likely multifactorial, constipation.  Quadriplegia with C5 injury in the past, gluteal ulcer chronic present on admission, hypotension resolved, probable acute cystitis/UTI cultures normal elder status post treatment.  Neurogenic bladder.  Chronic right-sided  hydronephrosis follow-up.  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  Symptomatic cholelithiasis.  Chronic microcytic anemia.  Moderate hiatal hernia.  Contractures bilateral upper extremity and lower extremity.    HOSPITAL COURSE  Patient is a 31 y.o. female presented to [facility] complaining of abdominal distention and protuberance associate with nausea, CT scan of the abdomen pelvis showed generalized distention no obvious obstruction, there were some stools distal portion of the colon.  She was also noted to have pyuria and possible UTI, cultures were performed with no growth.  Surgery was consulted, recommended the patient start on MiraLAX and bowel regimen.  She responded very well, multiple large bowel movement and since then no recurrence of abdominal distention.  On evaluation she has also multiple gluteal ulcers particular the right, wound care consulted and recommended local wound care.  Rest of her stay was uneventful.  We did request urology consult due to presence of right-sided hydronephrosis which appears to be chronic no change from previous studies.  This was confirmed by urology, secondary to neurogenic bladder.  Occasionally patient does require self-catheterization.  Recommended to follow-up with her urologist which patient prefers at [facility] where she follows.  Respite stay was uneventful, plan is to discharge home today.  Her significant others who is very supportive was present inside her room, was allowed to serve  to perform wound care, home health will also be provided for wound care at home.      VITAL SIGNS:      03/14/22  0500 03/15/22  0500 03/16/22  0500   Weight: 69.4 kg (152 lb 14.4 oz) (Last weight was a stated weight, not a scale weight.) 68.2 kg (150 lb 6.4 oz) 69.5 kg (153 lb 3.2 oz)     Body mass index is 23.99 kg/m².  Vitals:     03/16/22 0955   BP: 97/50   Pulse: 73   Resp: 18   Temp: 97.9 °F (36.6 °C)   SpO2: 97%     PHYSICAL EXAM:  General: Currently eating comfortable,awake, alert, oriented to self, place, and time, conversant very pleasant. No respiratory distress.    Skin:  Skin is warm and dry. No rash noted. No pallor.    HENT:  Head:  Normocephalic and atraumatic.  Mouth:  Moist mucous membranes.    Eyes:  Conjunctivae and EOM are normal.  Pupils are equal, round, and reactive to light.  No scleral icterus.    Neck:  Neck supple.  No JVD present.    Pulmonary/Chest:  No respiratory distress, no wheezes, no crackles, with normal breath sounds and good air movement.  Cardiovascular:  Normal rate, regular rhythm and normal heart sounds with no murmur.  Abdominal:  Soft.  Bowel sounds are normal.  No distension and no tenderness.   Extremities: Significant contractures both hands/upper extremity, lower extremity slightly contracted.  Neurological: Patient is C5 quadriplegic,  Genitourinary: Gluteal ulcers as previously described, no Esposito catheter  Back:  ----------    DISCHARGE MEDICATIONS:     Discharge Medications      New Medications      Instructions Start Date   polyethylene glycol 17 g packet  Commonly known as: MIRALAX   17 g, Oral, Daily   Start Date: March 17, 2022     sennosides-docusate 8.6-50 MG per tablet  Commonly known as: PERICOLACE   Take 2 tablets by mouth 2 (Two) Times a Day. Hold temporarily for day or 2 if bowel movement is more than twice a day.         Continue These Medications      Instructions Start Date   albuterol sulfate  (90 Base) MCG/ACT inhaler  Commonly known as: PROVENTIL HFA;VENTOLIN HFA;PROAIR HFA   2 puffs, Inhalation, Every 6 Hours PRN      dicyclomine 10 MG capsule  Commonly known as: BENTYL   10 mg, Oral, 3 Times Daily PRN      phenazopyridine 200 MG tablet  Commonly known as: PYRIDIUM   200 mg, Oral, 3 Times Daily PRN         Stop These Medications    nitrofurantoin  (macrocrystal-monohydrate) 100 MG capsule  Commonly known as: MACROBID            Diet Instructions    Resume as tolerated           Activity Instructions    Resume as tolerated         Additional Instructions for the Follow-ups that You Need to Schedule     Ambulatory Referral to Home Health   As directed      Face to Face Visit Date: 3/16/2022    Follow-up provider for Plan of Care?: I treated the patient in an acute care facility and will not continue treatment after discharge.    Follow-up provider: NAVEED KUMAR [[id number]]    Reason/Clinical Findings: Patient is quadriplegic has gluteal wounds last decubitus ulcer needs wound    Describe mobility limitations that make leaving home difficult: Patient is quadriplegic, requires help to be able to    Nursing/Therapeutic Services Requested: Skilled Nursing    Skilled nursing orders: Wound care dressing/changes    Frequency: 1 Week 1         Discharge Follow-up with PCP   As directed       Currently Documented PCP:    Provider, No Known    PCP Phone Number:    None     Follow Up Details: PCP 1 week            Follow-up Information     Provider, No Known .    Why: PCP 1 week  Contact information:  [facility] [id number]                         Tommy Lobato MD  03/16/22  10:08 EDT    Please note that this discharge summary required more than 30 minutes to complete.      Electronically signed by Tommy Lobato MD at 03/16/22 1008       Discharge Order (From admission, onward)     Start     Ordered    03/16/22 0948  Discharge patient  Once        Expected Discharge Date: 03/16/22    Discharge Disposition: Home-Health Care [facility]    Physician of Record for Attribution - Please select from Treatment Team: TOMMY LOBATO [[id number]]    Review needed by CMO to determine Physician of Record: No       Question Answer Comment   Physician of Record for Attribution - Please select from Treatment Team TOMMY LOBATO    Review needed by CMO to  determine Physician of Record No        03/16/22 0905

## 2022-03-16 NOTE — CASE MANAGEMENT/SOCIAL WORK
Discharge Planning Assessment   [facility]     Patient Name: Awais Hernandez  MRN: [id number]  Today's Date: 3/16/2022    Admit Date: 3/12/2022       Discharge Plan     Row Name 03/16/22 1524       Plan    [facility]  per [facility] stated that agency will not be able to accept pt due to not being able to accept Medicaid pt's at this time.  SS notified caregiver Juan that home health can't be arranged.    Final Discharge Disposition Code 01 - home or self-care    Row Name 03/16/22 6365       Plan    Plan Per Dianne with [facility] they are unable to admit pt at this time.  SS will make referral to [facility].  SS will follow.                  GENIA Hart

## 2022-11-30 ENCOUNTER — APPOINTMENT (OUTPATIENT)
Dept: WOUND CARE | Facility: HOSPITAL | Age: 32
End: 2022-11-30

## 2023-05-23 NOTE — PROGRESS NOTES
[facility] HOSPITALIST PROGRESS NOTE     Patient Identification:  Name:  Awais Dorman  Age:  28 y.o.  Sex:  female  :  1990  MRN:  [id number]  Visit Number:  [id number]  Primary Care Provider:  No Known Provider    Length of stay:  4    Subjective:  She denies any complaints, her nausea has resolved, nurses report no bowel movement with enema, they will try to examine her today for possible manual fecal extraction.  Dr. Adams evaluation and recommendation noted and appreciated.  Patient is afebrile, blood pressures is on the low side but she is symptomatic awake and alert she is not tachycardic.  CRP continues to trend down.  Urine culture grew 2 bacteria including Escherichia coli and Providencia stuartii.    Chief Complaint: Nausea  ----------------------------------------------------------------------------------------------------------------------  Current Hospital Meds:    cefepime 2 g Intravenous Q12H   collagenase  Topical Q24H   heparin (porcine) 5,000 Units Subcutaneous Q12H   lactobacillus acidophilus 1 capsule Oral Daily   sennosides-docusate sodium 2 tablet Oral Nightly        ----------------------------------------------------------------------------------------------------------------------  Vital Signs:  Temp:  [97.6 °F (36.4 °C)-98.8 °F (37.1 °C)] 98.7 °F (37.1 °C)  Heart Rate:  [79-85] 85  Resp:  [] 16  BP: (80-99)/(40-61) 80/40       Tele:   1    18  0423 18  0930   Weight: 45.4 kg (100 lb) 44.7 kg (98 lb 9.6 oz)     Body mass index is 15.44 kg/m².    Intake/Output Summary (Last 24 hours) at 18 1131  Last data filed at 18 0900   Gross per 24 hour   Intake              580 ml   Output                0 ml   Net              580 ml     Diet Regular  ----------------------------------------------------------------------------------------------------------------------  Physical exam:  General: Comfortable,awake, alert, oriented to self, place,  and time,  No respiratory distress.    Skin:  Skin is warm and dry. No rash noted. No pallor.    HENT:  Head:  Normocephalic and atraumatic.  Mouth:  Moist mucous membranes.    Eyes:  Conjunctivae and EOM are normal.  Pupils are equal, round, and reactive to light.  No scleral icterus.    Neck:  Neck supple.  No JVD present.    Pulmonary/Chest:  No respiratory distress, no wheezes, no crackles, with normal breath sounds and good air movement.  Cardiovascular:  Normal rate, regular rhythm and normal heart sounds with no murmur.  Abdominal:  Soft.  Bowel sounds are normal.  Slightly distende no tenderness.   Extremities:  Significant deformities of both hands from contracture, good radial and pedal pulses, no edema lower extremity.    Neurological:  Quadriplegic     ----------------------------------------------------------------------------------------------------------------------  ----------------------------------------------------------------------------------------------------------------------        Results from last 7 days  Lab Units 06/21/18  0036 06/20/18  0140 06/19/18  0157  06/17/18  0454   CRP mg/dL 5.00* 8.57* 15.72*  < >  --    LACTATE mmol/L  --   --   --   --  0.6   WBC 10*3/mm3 4.51 4.05* 4.17*  < > 16.70*   HEMOGLOBIN g/dL 8.0* 8.7* 7.9*  < > 9.8*   HEMATOCRIT % 27.1* 29.9* 27.3*  < > 31.4*   MCV fL 95.1* 96.8* 98.2*  < > 94.0   MCHC g/dL 29.5* 29.1* 28.9*  < > 31.2*   PLATELETS 10*3/mm3 225 234 262  < > 422*   < > = values in this interval not displayed.        Results from last 7 days  Lab Units 06/21/18  0036 06/20/18  0140 06/19/18  0157  06/17/18  0454   SODIUM mmol/L 137 139 138  < > 135   POTASSIUM mmol/L 3.7 4.2 4.1  < > 3.7   CHLORIDE mmol/L 114* 117* 116*  < > 108   CO2 mmol/L 17.2* 13.7* 12.9*  < > 18.9*   BUN mg/dL 17 17 19  < > 20   CREATININE mg/dL 1.76* 1.75* 1.76*  < > 1.90*   EGFR IF NONAFRICN AM mL/min/1.73 35* 35* 35*  < > 32*   CALCIUM mg/dL 7.5* 7.6* 7.4*  < > 8.8   GLUCOSE  mg/dL 101 98 74  < > 102   ALBUMIN g/dL  --   --   --   --  3.50   BILIRUBIN mg/dL  --   --   --   --  0.9   ALK PHOS U/L  --   --   --   --  83   AST (SGOT) U/L  --   --   --   --  10   ALT (SGPT) U/L  --   --   --   --  3*   < > = values in this interval not displayed.Estimated Creatinine Clearance: 33.9 mL/min (A) (by C-G formula based on SCr of 1.76 mg/dL (H)).    No results found for: AMMONIA      Blood Culture   Date Value Ref Range Status   06/17/2018 No growth at 4 days  Preliminary   06/17/2018 No growth at 4 days  Preliminary     Urine Culture   Date Value Ref Range Status   06/17/2018 >100,000 CFU/mL Providencia stuartii (A)  Final   06/17/2018 >100,000 CFU/mL Escherichia coli (A)  Final             I have personally looked at the labs and they are summarized above.  ----------------------------------------------------------------------------------------------------------------------  Imaging Results (last 24 hours)     ** No results found for the last 24 hours. **        ----------------------------------------------------------------------------------------------------------------------  Assessment and Plan:  - Acute right-sided pyelonephritis, Escherichia coli and Providencia stuartii on culture  - Quadriplegia  - Neurogenic bladder requiring self catheterization  - Sacral and gluteal decubiti    Will continue current antibiotic while awaiting for infectious disease service further recommendation, local wound care, will arrange for pointed with outpatient [facility].  For her to follow up when gets discharge.      Ana Lobato MD  06/21/18  11:31 AM   within functional limits